# Patient Record
Sex: MALE | Race: WHITE | HISPANIC OR LATINO | Employment: FULL TIME | URBAN - NONMETROPOLITAN AREA
[De-identification: names, ages, dates, MRNs, and addresses within clinical notes are randomized per-mention and may not be internally consistent; named-entity substitution may affect disease eponyms.]

---

## 2023-03-09 DIAGNOSIS — R97.20 ELEVATED PSA: ICD-10-CM

## 2023-03-09 RX ORDER — TAMSULOSIN HYDROCHLORIDE 0.4 MG/1
1 CAPSULE ORAL DAILY
COMMUNITY
Start: 2023-02-22 | End: 2023-03-09 | Stop reason: SDUPTHER

## 2023-03-10 RX ORDER — TAMSULOSIN HYDROCHLORIDE 0.4 MG/1
0.4 CAPSULE ORAL DAILY
Qty: 90 CAPSULE | Refills: 3 | Status: SHIPPED | OUTPATIENT
Start: 2023-03-10 | End: 2023-03-20

## 2023-03-18 DIAGNOSIS — R97.20 ELEVATED PSA: ICD-10-CM

## 2023-03-20 RX ORDER — TAMSULOSIN HYDROCHLORIDE 0.4 MG/1
CAPSULE ORAL
Qty: 30 CAPSULE | Refills: 11 | Status: SHIPPED | OUTPATIENT
Start: 2023-03-20 | End: 2024-05-14

## 2023-03-28 PROBLEM — R30.0 DYSURIA: Status: ACTIVE | Noted: 2023-03-28

## 2023-03-28 PROBLEM — M19.90 ARTHRITIS: Status: ACTIVE | Noted: 2023-03-28

## 2023-03-28 PROBLEM — R73.02 IGT (IMPAIRED GLUCOSE TOLERANCE): Status: ACTIVE | Noted: 2023-03-28

## 2023-03-28 PROBLEM — R97.20 ELEVATED PSA: Status: ACTIVE | Noted: 2023-03-28

## 2023-03-28 PROBLEM — R35.1 NOCTURIA: Status: ACTIVE | Noted: 2023-03-28

## 2023-03-28 PROBLEM — C18.9 MALIGNANT NEOPLASM OF COLON (MULTI): Status: ACTIVE | Noted: 2023-03-28

## 2023-03-28 PROBLEM — R21 RASH: Status: ACTIVE | Noted: 2023-03-28

## 2023-03-28 RX ORDER — CHLORTHALIDONE 25 MG/1
1 TABLET ORAL DAILY
COMMUNITY
Start: 2023-02-01 | End: 2023-05-18 | Stop reason: SDUPTHER

## 2023-03-28 RX ORDER — PANTOPRAZOLE SODIUM 40 MG/1
1 TABLET, DELAYED RELEASE ORAL DAILY
COMMUNITY
Start: 2023-02-01 | End: 2023-05-18 | Stop reason: SDUPTHER

## 2023-03-28 RX ORDER — ACETAMINOPHEN 500 MG
TABLET ORAL
COMMUNITY
Start: 2023-02-01

## 2023-03-28 RX ORDER — AMLODIPINE BESYLATE 5 MG/1
1 TABLET ORAL DAILY
COMMUNITY
Start: 2016-05-24 | End: 2024-02-19 | Stop reason: SDUPTHER

## 2023-03-28 RX ORDER — QUINAPRIL 40 MG/1
1 TABLET ORAL DAILY
COMMUNITY
Start: 2016-05-07 | End: 2023-05-08 | Stop reason: ALTCHOICE

## 2023-03-28 RX ORDER — CLOTRIMAZOLE AND BETAMETHASONE DIPROPIONATE 10; .64 MG/G; MG/G
1 CREAM TOPICAL 2 TIMES DAILY
COMMUNITY
Start: 2023-02-27 | End: 2023-05-08 | Stop reason: ALTCHOICE

## 2023-03-28 RX ORDER — POTASSIUM CHLORIDE 1500 MG/1
1 TABLET, EXTENDED RELEASE ORAL 2 TIMES DAILY
COMMUNITY
Start: 2023-02-01 | End: 2023-05-18 | Stop reason: SDUPTHER

## 2023-03-29 ENCOUNTER — OFFICE VISIT (OUTPATIENT)
Dept: PRIMARY CARE | Facility: CLINIC | Age: 68
End: 2023-03-29
Payer: COMMERCIAL

## 2023-03-29 ENCOUNTER — TELEPHONE (OUTPATIENT)
Dept: PRIMARY CARE | Facility: CLINIC | Age: 68
End: 2023-03-29

## 2023-03-29 ENCOUNTER — LAB (OUTPATIENT)
Dept: LAB | Facility: LAB | Age: 68
End: 2023-03-29
Payer: COMMERCIAL

## 2023-03-29 VITALS
BODY MASS INDEX: 48.03 KG/M2 | SYSTOLIC BLOOD PRESSURE: 144 MMHG | WEIGHT: 306 LBS | DIASTOLIC BLOOD PRESSURE: 94 MMHG | HEART RATE: 68 BPM | HEIGHT: 67 IN | OXYGEN SATURATION: 98 %

## 2023-03-29 DIAGNOSIS — M19.90 INFLAMMATORY ARTHROPATHY: Primary | ICD-10-CM

## 2023-03-29 DIAGNOSIS — M19.90 INFLAMMATORY ARTHROPATHY: ICD-10-CM

## 2023-03-29 DIAGNOSIS — K21.00 GASTROESOPHAGEAL REFLUX DISEASE WITH ESOPHAGITIS WITHOUT HEMORRHAGE: ICD-10-CM

## 2023-03-29 DIAGNOSIS — R07.89 ATYPICAL CHEST PAIN: ICD-10-CM

## 2023-03-29 LAB
C REACTIVE PROTEIN (MG/L) IN SER/PLAS: 1.67 MG/DL
SEDIMENTATION RATE, ERYTHROCYTE: 75 MM/H (ref 0–20)
URATE (MG/DL) IN SER/PLAS: 6.8 MG/DL (ref 4–7.5)

## 2023-03-29 PROCEDURE — 3077F SYST BP >= 140 MM HG: CPT | Performed by: FAMILY MEDICINE

## 2023-03-29 PROCEDURE — 86140 C-REACTIVE PROTEIN: CPT

## 2023-03-29 PROCEDURE — 84550 ASSAY OF BLOOD/URIC ACID: CPT

## 2023-03-29 PROCEDURE — 85652 RBC SED RATE AUTOMATED: CPT

## 2023-03-29 PROCEDURE — 1036F TOBACCO NON-USER: CPT | Performed by: FAMILY MEDICINE

## 2023-03-29 PROCEDURE — 1159F MED LIST DOCD IN RCRD: CPT | Performed by: FAMILY MEDICINE

## 2023-03-29 PROCEDURE — 86200 CCP ANTIBODY: CPT

## 2023-03-29 PROCEDURE — 86039 ANTINUCLEAR ANTIBODIES (ANA): CPT

## 2023-03-29 PROCEDURE — 99214 OFFICE O/P EST MOD 30 MIN: CPT | Performed by: FAMILY MEDICINE

## 2023-03-29 PROCEDURE — 86038 ANTINUCLEAR ANTIBODIES: CPT

## 2023-03-29 PROCEDURE — 36415 COLL VENOUS BLD VENIPUNCTURE: CPT

## 2023-03-29 PROCEDURE — 86225 DNA ANTIBODY NATIVE: CPT

## 2023-03-29 PROCEDURE — 86235 NUCLEAR ANTIGEN ANTIBODY: CPT

## 2023-03-29 PROCEDURE — 86431 RHEUMATOID FACTOR QUANT: CPT

## 2023-03-29 PROCEDURE — 3080F DIAST BP >= 90 MM HG: CPT | Performed by: FAMILY MEDICINE

## 2023-03-29 RX ORDER — PREDNISONE 10 MG/1
60 TABLET ORAL DAILY
Qty: 30 TABLET | Refills: 3 | Status: SHIPPED | OUTPATIENT
Start: 2023-03-29 | End: 2024-06-03

## 2023-03-29 RX ORDER — RAMIPRIL 10 MG/1
CAPSULE ORAL
COMMUNITY
End: 2023-05-18 | Stop reason: SDUPTHER

## 2023-03-29 RX ORDER — TRAMADOL HYDROCHLORIDE 50 MG/1
50 TABLET ORAL EVERY 6 HOURS PRN
Qty: 27 TABLET | Refills: 0 | Status: SHIPPED | OUTPATIENT
Start: 2023-03-29 | End: 2023-04-03

## 2023-03-29 NOTE — PROGRESS NOTES
"Subjective   Patient ID: Joseph Isaac is a 68 y.o. male who presents for ER Follow-up (Patient was admitted for severe heart burn. Had a CT scan done of the chest/ abdomen/ blood work. ) and Hand Pain (X 1 week. Has swelling and persistent throbbing/dull aching pain. Has been prescribed prednisone and is on his 4th day. States this has helped but has not had much relief. ).    HPI reviewed data from [facility], evaluated for chest pain.  Including CTA.  He has follow-up appointment scheduled with Dr. Anthony for probable EGD.  He notes when he goes to the gym and lifts weights there is no problems but if he is active after a meal he has some substernal epigastric discomfort.  6 days ago began with pain in the right wrist, he had driven in [place] had been doing some lifting helping father-in-law's transition to nursing care.  He has been on Pred 40 a day for 4 days and this pain and swelling is only beginning to slightly improve.  He has been wearing immobilizer.  No previous episode on right wrist but of couple years ago had a similar episode on left wrist.    Review of Systems as per HPI    Objective   BP (!) 144/94 (BP Location: Left arm, Patient Position: Sitting)   Pulse 68   Ht 1.702 m (5' 7\")   Wt (!) 139 kg (306 lb)   SpO2 98%   BMI 47.93 kg/m²     Physical Exam  Right wrist extends 15 flexes 15 diffuse fusiform swelling over the right wrist area in the hand.  Palpation shows no tenderness except in the ulnar styloid and the radiocarpal CMC joint region.  No overlying erythema.  Assessment/Plan   Problem List Items Addressed This Visit          Digestive    Gastroesophageal reflux disease       Musculoskeletal    Inflammatory arthropathy - Primary    Relevant Medications    predniSONE (Deltasone) 10 mg tablet    traMADol (Ultram) 50 mg tablet    Other Relevant Orders    GEOVANY with Reflex to IRIS    Citrulline Antibody, IgG    C-Reactive Protein    Rheumatoid Factor    Sedimentation Rate    Uric " Acid       Other    Atypical chest pain    Relevant Orders    Nuclear Stress Test     CT showed significant coronary calcifications and with his epigastric/substernal discomfort we will do a nuclear stress test, chemical as orthopedically limited with knee surgery, to see if any of these calcifications are hemodynamically significant.  Proceed with GI eval by Dr. Anthony upcoming would expect EGD  CAT scan showed no structural abnormalities  We will increase prednisone to 60 mg a day perform x-ray, and do inflammatory arthropathy laboratory.  Follow-up per telephone call as information becomes available

## 2023-03-29 NOTE — PROGRESS NOTES
"Subjective   Patient ID: Joseph Isaac is a 68 y.o. male who presents for ER Follow-up (Patient was admitted for severe heart burn. Had a CT scan done of the chest/ abdomen/ blood work. ) and Hand Pain (X 1 week. Has swelling and persistent throbbing/dull aching pain. Has been prescribed prednisone and is on his 4th day. States this has helped but has not had much relief. ).    HPI     Review of Systems    Objective   BP (!) 144/94 (BP Location: Left arm, Patient Position: Sitting)   Pulse 68   Ht 1.702 m (5' 7\")   Wt (!) 139 kg (306 lb)   SpO2 98%   BMI 47.93 kg/m²     Physical Exam    Assessment/Plan          "

## 2023-03-30 LAB
ANA PATTERN: ABNORMAL
ANA TITER: ABNORMAL
ANTI-CENTROMERE: <0.2 AI
ANTI-CHROMATIN: <0.2 AI
ANTI-DNA (DS): <1 IU/ML
ANTI-JO-1 IGG: <0.2 AI
ANTI-NUCLEAR ANTIBODY (ANA): POSITIVE
ANTI-RIBOSOMAL P: <0.2 AI
ANTI-RNP: 0.3 AI
ANTI-SCL-70: <0.2 AI
ANTI-SM/RNP: <0.2 AI
ANTI-SM: <0.2 AI
ANTI-SSA: <0.2 AI
ANTI-SSB: <0.2 AI
RHEUMATOID FACTOR (IU/ML) IN SERUM OR PLASMA: <10 IU/ML (ref 0–15)

## 2023-04-01 LAB — CITRULLINE ANTIBODY, IGG: 4 UNITS (ref 0–19)

## 2023-04-20 DIAGNOSIS — I25.110 CORONARY ARTERY DISEASE INVOLVING NATIVE HEART WITH UNSTABLE ANGINA PECTORIS, UNSPECIFIED VESSEL OR LESION TYPE (MULTI): Primary | ICD-10-CM

## 2023-05-03 ENCOUNTER — PATIENT OUTREACH (OUTPATIENT)
Dept: CARE COORDINATION | Facility: CLINIC | Age: 68
End: 2023-05-03
Payer: COMMERCIAL

## 2023-05-03 DIAGNOSIS — I25.10 ATHEROSCLEROSIS OF CORONARY ARTERY OF NATIVE HEART WITHOUT ANGINA PECTORIS, UNSPECIFIED VESSEL OR LESION TYPE: ICD-10-CM

## 2023-05-03 NOTE — PROGRESS NOTES
Discharge Facility: [facility]  Discharge Diagnosis:ASHD  Admission Date:4.28.23  Discharge Date: 5.2.23    PCP Appointment Date:not scheduled  Specialist Appointment Date: info culd not be found  Hospital Encounter and Summary: Linked   See discharge assessment below for further details     Engagement  Call Start Time: 1206 (5/3/2023 12:06 PM)    Medications  Medications reviewed with patient/caregiver?: No (dc med list from [place] not available) (5/3/2023 12:06 PM)  Is the patient having any side effects they believe may be caused by any medication additions or changes?: No (5/3/2023 12:06 PM)  Does the patient have all medications ordered at discharge?: Yes (5/3/2023 12:06 PM)  Is the patient taking all medications as directed (includes completed medication regime)?: -- (not taking pain med) (5/3/2023 12:06 PM)  Care Management Interventions: Provided patient education (5/3/2023 12:06 PM)    Appointments  Does the patient have a primary care provider?: Yes (5/3/2023 12:06 PM)  Care Management Interventions: -- (patient to call for appt) (5/3/2023 12:06 PM)  Has the patient kept scheduled appointments due by today?: Yes (5/3/2023 12:06 PM)    Self Management  What is the home health agency?: yes-ordered through [facility] (5/3/2023 12:06 PM)  Has home health visited the patient within 72 hours of discharge?: Not applicable (5/3/2023 12:06 PM)    Patient Teaching  Does the patient have access to their discharge instructions?: Yes (5/3/2023 12:06 PM)  Care Management Interventions: Reviewed instructions with patient (5/3/2023 12:06 PM)  What is the patient's perception of their health status since discharge?: Improving (5/3/2023 12:06 PM)

## 2023-05-04 ENCOUNTER — TELEPHONE (OUTPATIENT)
Dept: PRIMARY CARE | Facility: CLINIC | Age: 68
End: 2023-05-04
Payer: COMMERCIAL

## 2023-05-04 NOTE — TELEPHONE ENCOUNTER
CALLED TO REPORT WRIST PAIN AND DECREASED  X 2 DAYS. PREVIOUSLY GIVEN PREDNISONE, WHICH HE STILL HAS. SINCE HE HAD CABG X 4 4/28/23 HE DOES NOT WANT TO TAKE ANYTHING WITHOUT CHECKING WITH YOU FIRST. PLEASE ADVISE

## 2023-05-05 ENCOUNTER — TELEPHONE (OUTPATIENT)
Dept: PRIMARY CARE | Facility: CLINIC | Age: 68
End: 2023-05-05
Payer: COMMERCIAL

## 2023-05-08 ENCOUNTER — OFFICE VISIT (OUTPATIENT)
Dept: PRIMARY CARE | Facility: CLINIC | Age: 68
End: 2023-05-08
Payer: COMMERCIAL

## 2023-05-08 VITALS
HEART RATE: 71 BPM | OXYGEN SATURATION: 96 % | SYSTOLIC BLOOD PRESSURE: 140 MMHG | HEIGHT: 67 IN | BODY MASS INDEX: 47.89 KG/M2 | WEIGHT: 305.1 LBS | DIASTOLIC BLOOD PRESSURE: 90 MMHG

## 2023-05-08 DIAGNOSIS — R60.9 EDEMA, UNSPECIFIED TYPE: ICD-10-CM

## 2023-05-08 DIAGNOSIS — L03.116 CELLULITIS OF LEFT LOWER EXTREMITY: Primary | ICD-10-CM

## 2023-05-08 DIAGNOSIS — E66.01 MORBID OBESITY WITH BMI OF 45.0-49.9, ADULT (MULTI): ICD-10-CM

## 2023-05-08 DIAGNOSIS — I25.810 CORONARY ARTERY DISEASE INVOLVING AUTOLOGOUS ARTERY CORONARY BYPASS GRAFT WITHOUT ANGINA PECTORIS: ICD-10-CM

## 2023-05-08 DIAGNOSIS — M19.90 INFLAMMATORY ARTHROPATHY: ICD-10-CM

## 2023-05-08 PROCEDURE — 20605 DRAIN/INJ JOINT/BURSA W/O US: CPT | Performed by: FAMILY MEDICINE

## 2023-05-08 PROCEDURE — 3080F DIAST BP >= 90 MM HG: CPT | Performed by: FAMILY MEDICINE

## 2023-05-08 PROCEDURE — 3008F BODY MASS INDEX DOCD: CPT | Performed by: FAMILY MEDICINE

## 2023-05-08 PROCEDURE — 1160F RVW MEDS BY RX/DR IN RCRD: CPT | Performed by: FAMILY MEDICINE

## 2023-05-08 PROCEDURE — 1036F TOBACCO NON-USER: CPT | Performed by: FAMILY MEDICINE

## 2023-05-08 PROCEDURE — 99214 OFFICE O/P EST MOD 30 MIN: CPT | Performed by: FAMILY MEDICINE

## 2023-05-08 PROCEDURE — 3077F SYST BP >= 140 MM HG: CPT | Performed by: FAMILY MEDICINE

## 2023-05-08 PROCEDURE — 1159F MED LIST DOCD IN RCRD: CPT | Performed by: FAMILY MEDICINE

## 2023-05-08 RX ORDER — CEPHALEXIN 500 MG/1
500 CAPSULE ORAL 4 TIMES DAILY
Qty: 28 CAPSULE | Refills: 1 | Status: SHIPPED | OUTPATIENT
Start: 2023-05-08 | End: 2023-05-18 | Stop reason: ALTCHOICE

## 2023-05-08 RX ORDER — NITROGLYCERIN 0.4 MG/1
0.4 TABLET SUBLINGUAL EVERY 5 MIN PRN
COMMUNITY
Start: 2023-04-11

## 2023-05-08 RX ORDER — ASPIRIN 81 MG/1
162 TABLET ORAL
Qty: 60 TABLET | Refills: 0 | COMMUNITY
Start: 2023-05-03 | End: 2023-06-02

## 2023-05-08 RX ORDER — ATORVASTATIN CALCIUM 40 MG/1
40 TABLET, FILM COATED ORAL
Qty: 30 TABLET | Refills: 11 | COMMUNITY
Start: 2023-04-11 | End: 2024-04-10

## 2023-05-08 RX ORDER — METOPROLOL TARTRATE 25 MG/1
25 TABLET, FILM COATED ORAL 2 TIMES DAILY
Qty: 60 TABLET | Refills: 11 | COMMUNITY
Start: 2023-04-18 | End: 2023-05-18 | Stop reason: SDUPTHER

## 2023-05-08 NOTE — PROGRESS NOTES
"Subjective   Patient ID: Joseph Isaac is a 68 y.o. male who presents for s/p  TKR CABG.    HPI patient is approximately 10 days post CABG.  Leg wounds show more than the usual amount of erythema.  He was given Lasix for 7 days and potassium for 7 days post bypass.  There is edema in both ankles and the left is a little more swollen than the right.  He also has a large hematoma in the right thigh posteriorly.  No problems with chest wounds and drains.    During the hospitalization they declared he was diabetic with an A1c of 7.0 he had instruction was on 4 times daily fingersticks with insulin sliding scale.  He was prescribed Lantus 20 units at discharge but no prescription was provided.  He has been on sliding scale but has not had any coverage.  He brings in a list of all of his blood sugars his a.m.'s are averaging under 130 and presupper's are averaging under 140 he is on metformin 500 mg1/day with some GI side effects reviewed guidelines to show lifestyle and metformin will be the initial thrust and as he returns to his regular sense of wellbeing we will see if lifestyle changes can result in him becoming euglycemic.  We can reduce Accu-Cheks to twice daily and report them in a week  Cellulitis around the incisions on the left calf will be treated with doxycycline if tolerated  The asymmetry of swelling could be due to surgery or hematoma we have to consider a DVT so a Doppler is ordered  The inflammatory arthropathy versus degenerative in right wrist has again flared over the CMC joint.  Both I and the cardiovascular thoracic surgeon was reluctant to give systemic steroids at this time so I injected 10 mg of Kenalog and 0.25 mL of 0.5% bupivacaine under sterile technique to the right CMC joint  With BMI of 48 we will hope that diet to control diabetes will result in weight loss  Review of Systems  As per HPI  Objective   /90   Pulse 71   Ht 1.702 m (5' 7\")   Wt 138 kg (305 lb 1.6 oz)   SpO2 " 96%   BMI 47.79 kg/m²     Physical Exam  No bruit thyroid nontender heart regular no murmur lungs clear.  Midline sternotomy scar is minimal erythema at the edges about 3 mm it is slightly tender throughout but there is no fluctuance or fluid collection.  The drain wounds have a suture in place and have mild erythema of edge about 4 mm wounds in the leg have greater than 5 mm surrounding erythema some very mild distal erythema in the left calf is 2+ edema is tender positive Homans  Assessment/Plan   Problem List Items Addressed This Visit          Musculoskeletal    Inflammatory arthropathy       Endocrine/Metabolic    Morbid obesity with BMI of 45.0-49.9, adult (CMS/[facility])     Other Visit Diagnoses       Cellulitis of left lower extremity    -  Primary    Relevant Medications    cephalexin (Keflex) 500 mg capsule    Other Relevant Orders    Follow Up In Primary Care    Edema, unspecified type        Relevant Orders    Vascular US lower extremity venous duplex bilateral    Follow Up In Primary Care    Coronary artery disease involving autologous artery coronary bypass graft without angina pectoris        Relevant Medications    metoprolol tartrate (Lopressor) 25 mg tablet    nitroglycerin (Nitrostat) 0.4 mg SL tablet          Await Doppler results before considering anticoagulation.  Monitor for improvement in wrist postinjection.  After he recovers from surgery he will need rheumatology referral.   Report sugars in a couple weeks appointment follow-up in a couple weeks Doppler ASAP and Keflex for cellulitis

## 2023-05-11 ENCOUNTER — TELEPHONE (OUTPATIENT)
Dept: PRIMARY CARE | Facility: CLINIC | Age: 68
End: 2023-05-11
Payer: COMMERCIAL

## 2023-05-11 DIAGNOSIS — M19.90 INFLAMMATORY ARTHROPATHY: Primary | ICD-10-CM

## 2023-05-11 RX ORDER — PREDNISONE 10 MG/1
40 TABLET ORAL DAILY
Qty: 20 TABLET | Refills: 3 | Status: SHIPPED | OUTPATIENT
Start: 2023-05-11 | End: 2023-05-18 | Stop reason: ALTCHOICE

## 2023-05-11 NOTE — TELEPHONE ENCOUNTER
"PATIENT CALLED TO REPORT HE NOW HAS L WRIST PAIN AND EDEMA. PER CARDIOLOGIST, SHE FELT LOW DOSE PREDNISONE WOULD BE ACCEPTABLE IF NOTHING ELSE IS WORKING. USING OXYCODONE PRESCRIBED FOR INCISIONAL PAIN WITH ONLY A FEW HOURS OF RELIEF. SWELLING IS BOTHERSOME.    PATIENT CALLED BACK TO PROVIDE ADDITIONAL INFORMATION. CORTISONE INJECTION RECEIVED IN RIGHT WRIST DID VERY LITTLE TO HELP WITH PAIN. HE STATES HIS LEFT HAND IS \"HUGE\". PLEASE ADVISE.  "

## 2023-05-18 ENCOUNTER — TELEPHONE (OUTPATIENT)
Dept: PRIMARY CARE | Facility: CLINIC | Age: 68
End: 2023-05-18

## 2023-05-18 ENCOUNTER — OFFICE VISIT (OUTPATIENT)
Dept: PRIMARY CARE | Facility: CLINIC | Age: 68
End: 2023-05-18
Payer: COMMERCIAL

## 2023-05-18 VITALS
WEIGHT: 295.7 LBS | DIASTOLIC BLOOD PRESSURE: 90 MMHG | HEIGHT: 67 IN | HEART RATE: 55 BPM | OXYGEN SATURATION: 98 % | BODY MASS INDEX: 46.41 KG/M2 | SYSTOLIC BLOOD PRESSURE: 150 MMHG

## 2023-05-18 DIAGNOSIS — R60.9 EDEMA, UNSPECIFIED TYPE: ICD-10-CM

## 2023-05-18 DIAGNOSIS — K21.00 GASTROESOPHAGEAL REFLUX DISEASE WITH ESOPHAGITIS WITHOUT HEMORRHAGE: ICD-10-CM

## 2023-05-18 DIAGNOSIS — I10 ESSENTIAL HYPERTENSION: ICD-10-CM

## 2023-05-18 DIAGNOSIS — L03.116 CELLULITIS OF LEFT LOWER EXTREMITY: ICD-10-CM

## 2023-05-18 DIAGNOSIS — E66.01 SEVERE OBESITY (BMI >= 40) (MULTI): ICD-10-CM

## 2023-05-18 DIAGNOSIS — M19.90 INFLAMMATORY ARTHROPATHY: Primary | ICD-10-CM

## 2023-05-18 DIAGNOSIS — R73.02 IGT (IMPAIRED GLUCOSE TOLERANCE): ICD-10-CM

## 2023-05-18 PROCEDURE — 99214 OFFICE O/P EST MOD 30 MIN: CPT | Performed by: FAMILY MEDICINE

## 2023-05-18 PROCEDURE — 1036F TOBACCO NON-USER: CPT | Performed by: FAMILY MEDICINE

## 2023-05-18 PROCEDURE — 3080F DIAST BP >= 90 MM HG: CPT | Performed by: FAMILY MEDICINE

## 2023-05-18 PROCEDURE — 1160F RVW MEDS BY RX/DR IN RCRD: CPT | Performed by: FAMILY MEDICINE

## 2023-05-18 PROCEDURE — 3008F BODY MASS INDEX DOCD: CPT | Performed by: FAMILY MEDICINE

## 2023-05-18 PROCEDURE — 1159F MED LIST DOCD IN RCRD: CPT | Performed by: FAMILY MEDICINE

## 2023-05-18 PROCEDURE — 3077F SYST BP >= 140 MM HG: CPT | Performed by: FAMILY MEDICINE

## 2023-05-18 RX ORDER — METFORMIN HYDROCHLORIDE 500 MG/1
500 TABLET ORAL DAILY
COMMUNITY
End: 2023-05-18 | Stop reason: SDUPTHER

## 2023-05-18 RX ORDER — METFORMIN HYDROCHLORIDE 500 MG/1
500 TABLET ORAL DAILY
Qty: 90 TABLET | Refills: 3 | Status: SHIPPED | OUTPATIENT
Start: 2023-05-18 | End: 2024-05-07

## 2023-05-18 RX ORDER — CHLORTHALIDONE 25 MG/1
25 TABLET ORAL DAILY
Qty: 90 TABLET | Refills: 3 | Status: SHIPPED | OUTPATIENT
Start: 2023-05-18 | End: 2024-05-17

## 2023-05-18 RX ORDER — POTASSIUM CHLORIDE 1500 MG/1
20 TABLET, EXTENDED RELEASE ORAL 2 TIMES DAILY
Qty: 180 TABLET | Refills: 3 | Status: SHIPPED | OUTPATIENT
Start: 2023-05-18 | End: 2024-05-17

## 2023-05-18 RX ORDER — RAMIPRIL 10 MG/1
10 CAPSULE ORAL
Qty: 90 CAPSULE | Refills: 3 | Status: SHIPPED | OUTPATIENT
Start: 2023-05-18 | End: 2024-05-17

## 2023-05-18 RX ORDER — PANTOPRAZOLE SODIUM 40 MG/1
40 TABLET, DELAYED RELEASE ORAL DAILY
Qty: 90 TABLET | Refills: 3 | Status: SHIPPED | OUTPATIENT
Start: 2023-05-18 | End: 2024-05-17

## 2023-05-18 RX ORDER — METOPROLOL TARTRATE 25 MG/1
25 TABLET, FILM COATED ORAL 2 TIMES DAILY
Qty: 180 TABLET | Refills: 3 | Status: SHIPPED | OUTPATIENT
Start: 2023-05-18 | End: 2023-08-21 | Stop reason: SDUPTHER

## 2023-05-18 NOTE — TELEPHONE ENCOUNTER
Griselda from [facility] called to inform us that pt is declining any further home health visit for PT or nurse because pt states he is doing well. If you have any questions for Griselda her number is [phone].

## 2023-05-18 NOTE — TELEPHONE ENCOUNTER
Griselda from [facility] called to let us know Pt is declining all further home health visits for PT and nurses visits because he states he is doing well. If any questions Griselda's number is [phone].

## 2023-05-18 NOTE — PROGRESS NOTES
"Subjective   Patient ID: Joseph Isaac is a 68 y.o. male who presents for Follow-up (2 wk).    HPI looks remarkably better.  2 x 9'walks a day, shooting for 20  Replace [id number] as presented blood pressures are averaging above 140 above 85.  He was previously on and was stopped in the perioperative..  I feel he is reapproved indication.  We will monitor to see if edema further resolves with resumption of ACE inhibitor  Brings a list of Bs checks.  His fastings are under 130 and his presupper's are under 140, tries to limit to 60g carb and smaller portion.  Some of the weight loss is fluid reduction but he is really been good in his diet so he is in fact losing mass.  Maintained on metformin 501 a day   cellulitis on leg is resolved  Right wrist was injected previously and is resolved left wrist flared and he did have a taper of prednisone.  He has refills if flares.  Dr. Reyes is on leave until August we will refer to Dr. Couch  Edema is l greatly reduced   Review of Systems  Currently no fever chills no chest pains palpitations no cough shortness of breath.  He is increasing his exercise tolerance and capacity.  Objective   /90   Pulse 55   Ht 1.702 m (5' 7\")   Wt 134 kg (295 lb 11.2 oz)   SpO2 98%   BMI 46.31 kg/m²     Physical Exam  Wounds on chest drains and leg are clean and dry still small amount of scabbing.  Chest is regular without murmur lungs are clear to auscultation.  1+ edema in ankles.  Assessment/Plan   Problem List Items Addressed This Visit          Circulatory    Essential hypertension    Relevant Medications    chlorthalidone (Hygroton) 25 mg tablet    potassium chloride CR (K-Tab) 20 mEq ER tablet    ramipril (Altace) 10 mg capsule    metoprolol tartrate (Lopressor) 25 mg tablet       Digestive    Gastroesophageal reflux disease    Relevant Medications    pantoprazole (ProtoNix) 40 mg EC tablet       Musculoskeletal    Inflammatory arthropathy - Primary    Relevant Orders "    Referral to Rheumatology    Disability Placard       Endocrine/Metabolic    IGT (impaired glucose tolerance)    Relevant Medications    metFORMIN (Glucophage) 500 mg tablet     Other Visit Diagnoses       Cellulitis of left lower extremity        Edema, unspecified type

## 2023-05-18 NOTE — TELEPHONE ENCOUNTER
REFERRAL TO DR. PORTIA SIMON/RHEUMATOLOGY WITH INSURANCE, DEMOGRAPHICS, AND RECORDS FAXED WITH REQUEST TO SCHEDULE PATIENT AT FAX: [phone] AND PH: [phone].

## 2023-05-22 DIAGNOSIS — M19.90 INFLAMMATORY ARTHROPATHY: ICD-10-CM

## 2023-05-22 PROBLEM — E66.01 SEVERE OBESITY (BMI >= 40) (MULTI): Status: ACTIVE | Noted: 2023-05-22

## 2023-05-22 RX ORDER — PREDNISONE 10 MG/1
40 TABLET ORAL DAILY
Qty: 20 TABLET | Refills: 3 | Status: SHIPPED | OUTPATIENT
Start: 2023-05-22 | End: 2023-05-27

## 2023-05-22 NOTE — TELEPHONE ENCOUNTER
Pt requested refill on prednisone. Rx was discontinued in chart so old refills are not valid at pharmacy.

## 2023-06-02 ENCOUNTER — PATIENT OUTREACH (OUTPATIENT)
Dept: CARE COORDINATION | Facility: CLINIC | Age: 68
End: 2023-06-02
Payer: COMMERCIAL

## 2023-06-02 NOTE — PROGRESS NOTES
Unable to reach patient for one month post discharge follow up call.               M with call back number for patient to call if needed    If no voicemail available call attempts x 2 were made to contact the patient to assist                         any questions or concerns patient may have.

## 2023-08-21 ENCOUNTER — OFFICE VISIT (OUTPATIENT)
Dept: PRIMARY CARE | Facility: CLINIC | Age: 68
End: 2023-08-21
Payer: COMMERCIAL

## 2023-08-21 VITALS
OXYGEN SATURATION: 99 % | SYSTOLIC BLOOD PRESSURE: 120 MMHG | DIASTOLIC BLOOD PRESSURE: 82 MMHG | HEIGHT: 67 IN | HEART RATE: 60 BPM | BODY MASS INDEX: 46.93 KG/M2 | WEIGHT: 299 LBS

## 2023-08-21 DIAGNOSIS — E66.01 SEVERE OBESITY (BMI >= 40) (MULTI): ICD-10-CM

## 2023-08-21 DIAGNOSIS — R73.02 IGT (IMPAIRED GLUCOSE TOLERANCE): Primary | ICD-10-CM

## 2023-08-21 DIAGNOSIS — I25.810 CORONARY ARTERY DISEASE INVOLVING AUTOLOGOUS ARTERY CORONARY BYPASS GRAFT WITHOUT ANGINA PECTORIS: ICD-10-CM

## 2023-08-21 DIAGNOSIS — M19.90 INFLAMMATORY ARTHROPATHY: ICD-10-CM

## 2023-08-21 DIAGNOSIS — I10 ESSENTIAL HYPERTENSION: ICD-10-CM

## 2023-08-21 DIAGNOSIS — K21.00 GASTROESOPHAGEAL REFLUX DISEASE WITH ESOPHAGITIS WITHOUT HEMORRHAGE: ICD-10-CM

## 2023-08-21 PROCEDURE — 99214 OFFICE O/P EST MOD 30 MIN: CPT | Performed by: FAMILY MEDICINE

## 2023-08-21 PROCEDURE — 1036F TOBACCO NON-USER: CPT | Performed by: FAMILY MEDICINE

## 2023-08-21 PROCEDURE — 3079F DIAST BP 80-89 MM HG: CPT | Performed by: FAMILY MEDICINE

## 2023-08-21 PROCEDURE — 3074F SYST BP LT 130 MM HG: CPT | Performed by: FAMILY MEDICINE

## 2023-08-21 PROCEDURE — 1160F RVW MEDS BY RX/DR IN RCRD: CPT | Performed by: FAMILY MEDICINE

## 2023-08-21 PROCEDURE — 3008F BODY MASS INDEX DOCD: CPT | Performed by: FAMILY MEDICINE

## 2023-08-21 PROCEDURE — 1159F MED LIST DOCD IN RCRD: CPT | Performed by: FAMILY MEDICINE

## 2023-08-21 RX ORDER — METOPROLOL TARTRATE 25 MG/1
25 TABLET, FILM COATED ORAL NIGHTLY
Qty: 90 TABLET | Refills: 3 | Status: SHIPPED | OUTPATIENT
Start: 2023-08-21 | End: 2024-08-20

## 2023-08-21 NOTE — PROGRESS NOTES
"Subjective   Patient ID: Joseph Isaac is a 68 y.o. male who presents for Follow-up (6 mo).    HPI in April of this year he had a CABG  Has completed 24 of 36 card rehab.    Occ vikas   mid morning- cardio awarre, reduce meto prolol to dailoy and ordered sleep study  To see igo for wrist arthritis  Weight up 4# inlast 3 mo  Review of Systems  General-resolution of fatigue, weight to within 10 pounds  ENT no problems with vision swallowing  Cardiac no chest pains palpitations change in exercise tolerance or capacity  Pulmonary no cough shortness of breath  GI no heartburn or abdominal pain  Musculoskeletal no joint pains  Objective   /82   Pulse 60   Ht 1.702 m (5' 7\")   Wt 136 kg (299 lb)   SpO2 99%   BMI 46.83 kg/m²     Physical Exam  General:  Alert, No acute distress. Appears stated age  Eye:  Pupils are equal, round and reactive to light, Extraocular movements are intact, Normal conjunctiva.    Neck:  Supple, Non-tender, No carotid bruit, No jugular venous distention, No lymphadenopathy, No thyromegaly.    Respiratory:  Lungs are clear to auscultation, Respirations are non-labored, Breath sounds are equal.    Cardiovascular:  Normal rate, Regular rhythm, No murmur.    Gastrointestinal:  Soft, Non-tender, No organomegaly. No solid or pulsatile mass  Integumentary:  Warm, Dry. No concerning lesions on exposed areas  Neurologic:  Alert, Oriented.  Gross and fine motor intact, CN 2-12 intact  Psychiatric:  Cooperative, Appropriate mood & affect.  Assessment/Plan   Problem List Items Addressed This Visit       Gastroesophageal reflux disease    Relevant Orders    Follow Up In Primary Care    Essential hypertension    Relevant Medications    metoprolol tartrate (Lopressor) 25 mg tablet    Other Relevant Orders    Follow Up In Primary Care    IGT (impaired glucose tolerance) - Primary    Relevant Orders    Comprehensive Metabolic Panel    CBC    Albumin , Urine Random    Hemoglobin A1C    Follow Up In " Primary Care    Inflammatory arthropathy    Severe obesity (BMI >= 40) (CMS/[facility])    Relevant Orders    Follow Up In Primary Care    Coronary artery disease involving autologous artery coronary bypass graft without angina pectoris    Relevant Medications    metoprolol tartrate (Lopressor) 25 mg tablet    Other Relevant Orders    CBC    Lipid Panel    Follow Up In Primary Care

## 2023-09-06 NOTE — TELEPHONE ENCOUNTER
FORWARDED REQUEST TO [facility] TO SCHEDULE PATIENT FOR US LOWER EXTREMITY VENOUS DUPLEX FOR TOMORROW AT 1:00.  PATIENT'S WIFE GIVEN PREPROCEDURE INSTRUCTIONS.    severe

## 2023-09-21 DIAGNOSIS — M19.90 INFLAMMATORY ARTHROPATHY: Primary | ICD-10-CM

## 2023-09-21 RX ORDER — PREDNISONE 10 MG/1
40 TABLET ORAL DAILY
Qty: 20 TABLET | Refills: 1 | Status: SHIPPED | OUTPATIENT
Start: 2023-09-21 | End: 2023-09-26

## 2023-09-26 ENCOUNTER — TELEPHONE (OUTPATIENT)
Dept: PRIMARY CARE | Facility: CLINIC | Age: 68
End: 2023-09-26
Payer: COMMERCIAL

## 2023-09-26 NOTE — TELEPHONE ENCOUNTER
PATIENT CALLED TO NOTIFY HIS APPOINTMENT WITH DR ANTONY HAD TO BE RESCHEDULED SECONDARY TO HER BEING ON LEAVE OF ABSENCE. WISHES TO HAVE A REFERRAL BACK TO DR. SIMON. WILL PROPOSE AND FORWARD RECORDS FOR THEIR OFFICE TO SCHEDULE AND CALL PATIENT.

## 2023-11-17 DIAGNOSIS — Z85.038 PERSONAL HISTORY OF COLON CANCER: ICD-10-CM

## 2023-11-17 DIAGNOSIS — Z12.11 SPECIAL SCREENING FOR MALIGNANT NEOPLASM OF COLON: ICD-10-CM

## 2023-11-30 ENCOUNTER — APPOINTMENT (OUTPATIENT)
Dept: PRIMARY CARE | Facility: CLINIC | Age: 68
End: 2023-11-30
Payer: COMMERCIAL

## 2023-12-14 ENCOUNTER — APPOINTMENT (OUTPATIENT)
Dept: PRIMARY CARE | Facility: CLINIC | Age: 68
End: 2023-12-14
Payer: COMMERCIAL

## 2023-12-21 ENCOUNTER — APPOINTMENT (OUTPATIENT)
Dept: OPERATING ROOM | Facility: HOSPITAL | Age: 68
End: 2023-12-21
Payer: COMMERCIAL

## 2023-12-26 ENCOUNTER — APPOINTMENT (OUTPATIENT)
Dept: RADIOLOGY | Facility: CLINIC | Age: 68
End: 2023-12-26
Payer: COMMERCIAL

## 2023-12-26 ENCOUNTER — ANCILLARY PROCEDURE (OUTPATIENT)
Dept: RADIOLOGY | Facility: CLINIC | Age: 68
End: 2023-12-26
Payer: COMMERCIAL

## 2023-12-26 ENCOUNTER — LAB (OUTPATIENT)
Dept: LAB | Facility: LAB | Age: 68
End: 2023-12-26
Payer: COMMERCIAL

## 2023-12-26 DIAGNOSIS — E55.9 VITAMIN D DEFICIENCY, UNSPECIFIED: ICD-10-CM

## 2023-12-26 DIAGNOSIS — Z79.1 LONG TERM (CURRENT) USE OF NON-STEROIDAL ANTI-INFLAMMATORIES (NSAID): ICD-10-CM

## 2023-12-26 DIAGNOSIS — Z79.82 LONG TERM (CURRENT) USE OF ASPIRIN: ICD-10-CM

## 2023-12-26 DIAGNOSIS — Z87.39 PERSONAL HISTORY OF OTHER DISEASES OF THE MUSCULOSKELETAL SYSTEM AND CONNECTIVE TISSUE: ICD-10-CM

## 2023-12-26 DIAGNOSIS — M79.642 PAIN IN LEFT HAND: ICD-10-CM

## 2023-12-26 DIAGNOSIS — M79.641 PAIN IN RIGHT HAND: ICD-10-CM

## 2023-12-26 DIAGNOSIS — M89.9 DISORDER OF BONE, UNSPECIFIED: ICD-10-CM

## 2023-12-26 DIAGNOSIS — M25.531 PAIN IN RIGHT WRIST: ICD-10-CM

## 2023-12-26 DIAGNOSIS — Z82.61 FAMILY HISTORY OF ARTHRITIS: ICD-10-CM

## 2023-12-26 DIAGNOSIS — R53.83 OTHER FATIGUE: ICD-10-CM

## 2023-12-26 DIAGNOSIS — M79.641 PAIN IN RIGHT HAND: Primary | ICD-10-CM

## 2023-12-26 DIAGNOSIS — Z96.653 PRESENCE OF ARTIFICIAL KNEE JOINT, BILATERAL: ICD-10-CM

## 2023-12-26 DIAGNOSIS — M25.532 PAIN IN LEFT WRIST: ICD-10-CM

## 2023-12-26 DIAGNOSIS — Z79.899 OTHER LONG TERM (CURRENT) DRUG THERAPY: ICD-10-CM

## 2023-12-26 DIAGNOSIS — I25.810 CORONARY ARTERY DISEASE INVOLVING AUTOLOGOUS ARTERY CORONARY BYPASS GRAFT WITHOUT ANGINA PECTORIS: ICD-10-CM

## 2023-12-26 DIAGNOSIS — E78.5 HYPERLIPIDEMIA, UNSPECIFIED: ICD-10-CM

## 2023-12-26 DIAGNOSIS — I10 ESSENTIAL (PRIMARY) HYPERTENSION: ICD-10-CM

## 2023-12-26 DIAGNOSIS — R73.02 IGT (IMPAIRED GLUCOSE TOLERANCE): ICD-10-CM

## 2023-12-26 DIAGNOSIS — M94.9 DISORDER OF CARTILAGE, UNSPECIFIED: ICD-10-CM

## 2023-12-26 LAB
ALBUMIN SERPL BCP-MCNC: 4.3 G/DL (ref 3.4–5)
ALP SERPL-CCNC: 96 U/L (ref 33–136)
ALT SERPL W P-5'-P-CCNC: 14 U/L (ref 10–52)
ANION GAP SERPL CALC-SCNC: 12 MMOL/L (ref 10–20)
AST SERPL W P-5'-P-CCNC: 18 U/L (ref 9–39)
BASOPHILS # BLD AUTO: 0.06 X10*3/UL (ref 0–0.1)
BASOPHILS NFR BLD AUTO: 0.8 %
BILIRUB SERPL-MCNC: 0.9 MG/DL (ref 0–1.2)
BUN SERPL-MCNC: 15 MG/DL (ref 6–23)
CALCIUM SERPL-MCNC: 9.6 MG/DL (ref 8.6–10.3)
CHLORIDE SERPL-SCNC: 101 MMOL/L (ref 98–107)
CHOLEST SERPL-MCNC: 126 MG/DL (ref 0–199)
CHOLESTEROL/HDL RATIO: 3
CK SERPL-CCNC: 72 U/L (ref 0–325)
CO2 SERPL-SCNC: 31 MMOL/L (ref 21–32)
CREAT SERPL-MCNC: 0.83 MG/DL (ref 0.5–1.3)
CRP SERPL-MCNC: 1.02 MG/DL
EOSINOPHIL # BLD AUTO: 0.1 X10*3/UL (ref 0–0.7)
EOSINOPHIL NFR BLD AUTO: 1.4 %
ERYTHROCYTE [DISTWIDTH] IN BLOOD BY AUTOMATED COUNT: 16.8 % (ref 11.5–14.5)
ERYTHROCYTE [SEDIMENTATION RATE] IN BLOOD BY WESTERGREN METHOD: 44 MM/H (ref 0–20)
EST. AVERAGE GLUCOSE BLD GHB EST-MCNC: 148 MG/DL
GFR SERPL CREATININE-BSD FRML MDRD: >90 ML/MIN/1.73M*2
GLUCOSE SERPL-MCNC: 95 MG/DL (ref 74–99)
HBA1C MFR BLD: 6.8 %
HCT VFR BLD AUTO: 45.5 % (ref 41–52)
HDLC SERPL-MCNC: 42 MG/DL
HGB BLD-MCNC: 14 G/DL (ref 13.5–17.5)
IMM GRANULOCYTES # BLD AUTO: 0.02 X10*3/UL (ref 0–0.7)
IMM GRANULOCYTES NFR BLD AUTO: 0.3 % (ref 0–0.9)
LDLC SERPL CALC-MCNC: 64 MG/DL
LYMPHOCYTES # BLD AUTO: 1.82 X10*3/UL (ref 1.2–4.8)
LYMPHOCYTES NFR BLD AUTO: 25.1 %
MAGNESIUM SERPL-MCNC: 2.03 MG/DL (ref 1.6–2.4)
MCH RBC QN AUTO: 24.4 PG (ref 26–34)
MCHC RBC AUTO-ENTMCNC: 30.8 G/DL (ref 32–36)
MCV RBC AUTO: 79 FL (ref 80–100)
MONOCYTES # BLD AUTO: 0.61 X10*3/UL (ref 0.1–1)
MONOCYTES NFR BLD AUTO: 8.4 %
NEUTROPHILS # BLD AUTO: 4.64 X10*3/UL (ref 1.2–7.7)
NEUTROPHILS NFR BLD AUTO: 64 %
NON HDL CHOLESTEROL: 84 MG/DL (ref 0–149)
NRBC BLD-RTO: 0 /100 WBCS (ref 0–0)
PLATELET # BLD AUTO: 247 X10*3/UL (ref 150–450)
POTASSIUM SERPL-SCNC: 3.9 MMOL/L (ref 3.5–5.3)
PROT SERPL-MCNC: 6.9 G/DL (ref 6.4–8.2)
RBC # BLD AUTO: 5.74 X10*6/UL (ref 4.5–5.9)
SODIUM SERPL-SCNC: 140 MMOL/L (ref 136–145)
TRIGL SERPL-MCNC: 99 MG/DL (ref 0–149)
URATE SERPL-MCNC: 7.5 MG/DL (ref 4–7.5)
VLDL: 20 MG/DL (ref 0–40)
WBC # BLD AUTO: 7.3 X10*3/UL (ref 4.4–11.3)

## 2023-12-26 PROCEDURE — 86235 NUCLEAR ANTIGEN ANTIBODY: CPT

## 2023-12-26 PROCEDURE — 86431 RHEUMATOID FACTOR QUANT: CPT

## 2023-12-26 PROCEDURE — 86225 DNA ANTIBODY NATIVE: CPT

## 2023-12-26 PROCEDURE — 82550 ASSAY OF CK (CPK): CPT

## 2023-12-26 PROCEDURE — 73120 X-RAY EXAM OF HAND: CPT | Mod: BILATERAL PROCEDURE | Performed by: RADIOLOGY

## 2023-12-26 PROCEDURE — 80053 COMPREHEN METABOLIC PANEL: CPT

## 2023-12-26 PROCEDURE — 83036 HEMOGLOBIN GLYCOSYLATED A1C: CPT

## 2023-12-26 PROCEDURE — 87476 LYME DIS DNA AMP PROBE: CPT

## 2023-12-26 PROCEDURE — 73120 X-RAY EXAM OF HAND: CPT | Mod: 50

## 2023-12-26 PROCEDURE — 85025 COMPLETE CBC W/AUTO DIFF WBC: CPT

## 2023-12-26 PROCEDURE — 84155 ASSAY OF PROTEIN SERUM: CPT

## 2023-12-26 PROCEDURE — 80061 LIPID PANEL: CPT

## 2023-12-26 PROCEDURE — 84165 PROTEIN E-PHORESIS SERUM: CPT

## 2023-12-26 PROCEDURE — 85652 RBC SED RATE AUTOMATED: CPT

## 2023-12-26 PROCEDURE — 82043 UR ALBUMIN QUANTITATIVE: CPT

## 2023-12-26 PROCEDURE — 73100 X-RAY EXAM OF WRIST: CPT | Mod: BILATERAL PROCEDURE | Performed by: RADIOLOGY

## 2023-12-26 PROCEDURE — 73100 X-RAY EXAM OF WRIST: CPT | Mod: 50

## 2023-12-26 PROCEDURE — 83516 IMMUNOASSAY NONANTIBODY: CPT

## 2023-12-26 PROCEDURE — 83735 ASSAY OF MAGNESIUM: CPT

## 2023-12-26 PROCEDURE — 86320 SERUM IMMUNOELECTROPHORESIS: CPT | Performed by: INTERNAL MEDICINE

## 2023-12-26 PROCEDURE — 82570 ASSAY OF URINE CREATININE: CPT

## 2023-12-26 PROCEDURE — 86200 CCP ANTIBODY: CPT

## 2023-12-26 PROCEDURE — 36415 COLL VENOUS BLD VENIPUNCTURE: CPT

## 2023-12-26 PROCEDURE — 84550 ASSAY OF BLOOD/URIC ACID: CPT

## 2023-12-26 PROCEDURE — 86334 IMMUNOFIX E-PHORESIS SERUM: CPT

## 2023-12-26 PROCEDURE — 86140 C-REACTIVE PROTEIN: CPT

## 2023-12-26 PROCEDURE — 84165 PROTEIN E-PHORESIS SERUM: CPT | Performed by: INTERNAL MEDICINE

## 2023-12-26 PROCEDURE — 86038 ANTINUCLEAR ANTIBODIES: CPT

## 2023-12-27 LAB
ANA PATTERN: ABNORMAL
ANA SER QL HEP2 SUBST: POSITIVE
ANA TITR SER IF: ABNORMAL {TITER}
CCP IGG SERPL-ACNC: <1 U/ML
CENTROMERE B AB SER-ACNC: <0.2 AI
CHROMATIN AB SERPL-ACNC: <0.2 AI
CREAT UR-MCNC: 100.5 MG/DL (ref 20–370)
DSDNA AB SER-ACNC: <1 IU/ML
ENA JO1 AB SER QL IA: <0.2 AI
ENA RNP AB SER IA-ACNC: 0.3 AI
ENA SCL70 AB SER QL IA: <0.2 AI
ENA SM AB SER IA-ACNC: <0.2 AI
ENA SM+RNP AB SER QL IA: <0.2 AI
ENA SS-A AB SER IA-ACNC: <0.2 AI
ENA SS-B AB SER IA-ACNC: <0.2 AI
MICROALBUMIN UR-MCNC: 24.9 MG/L
MICROALBUMIN/CREAT UR: 24.8 UG/MG CREAT
PROT SERPL-MCNC: 7.1 G/DL (ref 6.4–8.2)
RHEUMATOID FACT SER NEPH-ACNC: <10 IU/ML (ref 0–15)
RIBOSOMAL P AB SER-ACNC: <0.2 AI
TTG IGA SER IA-ACNC: <1 U/ML

## 2023-12-29 LAB
B BURGDOR DNA SPEC QL NAA+PROBE: NOT DETECTED
SPECIMEN SOURCE: NORMAL

## 2024-01-01 LAB
ALBUMIN: 3.9 G/DL (ref 3.4–5)
ALPHA 1 GLOBULIN: 0.3 G/DL (ref 0.2–0.6)
ALPHA 2 GLOBULIN: 0.9 G/DL (ref 0.4–1.1)
BETA GLOBULIN: 1 G/DL (ref 0.5–1.2)
GAMMA GLOBULIN: 0.9 G/DL (ref 0.5–1.4)
IMMUNOFIXATION COMMENT: NORMAL
PATH REVIEW - SERUM IMMUNOFIXATION: NORMAL
PATH REVIEW-SERUM PROTEIN ELECTROPHORESIS: NORMAL
PROTEIN ELECTROPHORESIS COMMENT: NORMAL

## 2024-01-04 VITALS — BODY MASS INDEX: 48.75 KG/M2 | WEIGHT: 310.63 LBS | HEIGHT: 67 IN

## 2024-01-04 RX ORDER — ASPIRIN 81 MG/1
162 TABLET ORAL DAILY
COMMUNITY
Start: 2023-12-18

## 2024-01-04 RX ORDER — CELECOXIB 200 MG/1
200 CAPSULE ORAL
COMMUNITY
Start: 2023-12-18

## 2024-01-08 ENCOUNTER — HOSPITAL ENCOUNTER (OUTPATIENT)
Dept: OPERATING ROOM | Facility: HOSPITAL | Age: 69
Setting detail: OUTPATIENT SURGERY
Discharge: HOME | End: 2024-01-08
Payer: COMMERCIAL

## 2024-01-08 ENCOUNTER — ANESTHESIA EVENT (OUTPATIENT)
Dept: OPERATING ROOM | Facility: HOSPITAL | Age: 69
End: 2024-01-08
Payer: COMMERCIAL

## 2024-01-08 ENCOUNTER — ANESTHESIA (OUTPATIENT)
Dept: OPERATING ROOM | Facility: HOSPITAL | Age: 69
End: 2024-01-08
Payer: COMMERCIAL

## 2024-01-08 VITALS
OXYGEN SATURATION: 98 % | TEMPERATURE: 97.9 F | SYSTOLIC BLOOD PRESSURE: 135 MMHG | RESPIRATION RATE: 20 BRPM | DIASTOLIC BLOOD PRESSURE: 69 MMHG | HEART RATE: 64 BPM

## 2024-01-08 DIAGNOSIS — Z85.038 PERSONAL HISTORY OF COLON CANCER: ICD-10-CM

## 2024-01-08 DIAGNOSIS — Z12.11 SPECIAL SCREENING FOR MALIGNANT NEOPLASM OF COLON: ICD-10-CM

## 2024-01-08 LAB — GLUCOSE BLD MANUAL STRIP-MCNC: 109 MG/DL (ref 74–99)

## 2024-01-08 PROCEDURE — 2500000004 HC RX 250 GENERAL PHARMACY W/ HCPCS (ALT 636 FOR OP/ED): Performed by: ANESTHESIOLOGY

## 2024-01-08 PROCEDURE — 3700000002 HC GENERAL ANESTHESIA TIME - EACH INCREMENTAL 1 MINUTE: Performed by: INTERNAL MEDICINE

## 2024-01-08 PROCEDURE — 3600000002 HC OR TIME - INITIAL BASE CHARGE - PROCEDURE LEVEL TWO: Performed by: INTERNAL MEDICINE

## 2024-01-08 PROCEDURE — 82947 ASSAY GLUCOSE BLOOD QUANT: CPT

## 2024-01-08 PROCEDURE — 7100000010 HC PHASE TWO TIME - EACH INCREMENTAL 1 MINUTE: Performed by: INTERNAL MEDICINE

## 2024-01-08 PROCEDURE — 45378 DIAGNOSTIC COLONOSCOPY: CPT | Performed by: INTERNAL MEDICINE

## 2024-01-08 PROCEDURE — 2500000005 HC RX 250 GENERAL PHARMACY W/O HCPCS: Performed by: ANESTHESIOLOGY

## 2024-01-08 PROCEDURE — 7100000009 HC PHASE TWO TIME - INITIAL BASE CHARGE: Performed by: INTERNAL MEDICINE

## 2024-01-08 PROCEDURE — 3600000007 HC OR TIME - EACH INCREMENTAL 1 MINUTE - PROCEDURE LEVEL TWO: Performed by: INTERNAL MEDICINE

## 2024-01-08 PROCEDURE — 3700000001 HC GENERAL ANESTHESIA TIME - INITIAL BASE CHARGE: Performed by: INTERNAL MEDICINE

## 2024-01-08 RX ORDER — PROPOFOL 10 MG/ML
INJECTION, EMULSION INTRAVENOUS AS NEEDED
Status: DISCONTINUED | OUTPATIENT
Start: 2024-01-08 | End: 2024-01-08

## 2024-01-08 RX ORDER — SODIUM CHLORIDE, SODIUM LACTATE, POTASSIUM CHLORIDE, CALCIUM CHLORIDE 600; 310; 30; 20 MG/100ML; MG/100ML; MG/100ML; MG/100ML
125 INJECTION, SOLUTION INTRAVENOUS CONTINUOUS
OUTPATIENT
Start: 2024-01-08

## 2024-01-08 RX ORDER — HYDROMORPHONE HYDROCHLORIDE 2 MG/ML
0.5 INJECTION, SOLUTION INTRAMUSCULAR; INTRAVENOUS; SUBCUTANEOUS EVERY 5 MIN PRN
OUTPATIENT
Start: 2024-01-08

## 2024-01-08 RX ORDER — LIDOCAINE HYDROCHLORIDE 10 MG/ML
INJECTION, SOLUTION EPIDURAL; INFILTRATION; INTRACAUDAL; PERINEURAL AS NEEDED
Status: DISCONTINUED | OUTPATIENT
Start: 2024-01-08 | End: 2024-01-08

## 2024-01-08 RX ORDER — SODIUM CHLORIDE, SODIUM LACTATE, POTASSIUM CHLORIDE, CALCIUM CHLORIDE 600; 310; 30; 20 MG/100ML; MG/100ML; MG/100ML; MG/100ML
75 INJECTION, SOLUTION INTRAVENOUS CONTINUOUS
Status: DISCONTINUED | OUTPATIENT
Start: 2024-01-08 | End: 2024-01-09 | Stop reason: HOSPADM

## 2024-01-08 RX ORDER — ONDANSETRON HYDROCHLORIDE 2 MG/ML
4 INJECTION, SOLUTION INTRAVENOUS
OUTPATIENT
Start: 2024-01-08

## 2024-01-08 RX ORDER — SODIUM CHLORIDE, SODIUM LACTATE, POTASSIUM CHLORIDE, CALCIUM CHLORIDE 600; 310; 30; 20 MG/100ML; MG/100ML; MG/100ML; MG/100ML
125 INJECTION, SOLUTION INTRAVENOUS CONTINUOUS
Status: DISCONTINUED | OUTPATIENT
Start: 2024-01-08 | End: 2024-01-09 | Stop reason: HOSPADM

## 2024-01-08 RX ADMIN — PROPOFOL 50 MG: 10 INJECTION, EMULSION INTRAVENOUS at 07:44

## 2024-01-08 RX ADMIN — LIDOCAINE HYDROCHLORIDE 5 ML: 10 INJECTION, SOLUTION EPIDURAL; INFILTRATION; INTRACAUDAL; PERINEURAL at 07:33

## 2024-01-08 RX ADMIN — PROPOFOL 100 MG: 10 INJECTION, EMULSION INTRAVENOUS at 07:32

## 2024-01-08 RX ADMIN — SODIUM CHLORIDE, POTASSIUM CHLORIDE, SODIUM LACTATE AND CALCIUM CHLORIDE 75 ML/HR: 600; 310; 30; 20 INJECTION, SOLUTION INTRAVENOUS at 07:27

## 2024-01-08 RX ADMIN — PROPOFOL 100 MG: 10 INJECTION, EMULSION INTRAVENOUS at 07:37

## 2024-01-08 SDOH — HEALTH STABILITY: MENTAL HEALTH: CURRENT SMOKER: 0

## 2024-01-08 ASSESSMENT — PAIN - FUNCTIONAL ASSESSMENT: PAIN_FUNCTIONAL_ASSESSMENT: 0-10

## 2024-01-08 ASSESSMENT — PAIN SCALES - GENERAL
PAINLEVEL_OUTOF10: 0 - NO PAIN
PAIN_LEVEL: 3
PAINLEVEL_OUTOF10: 0 - NO PAIN
PAINLEVEL_OUTOF10: 0 - NO PAIN

## 2024-01-08 ASSESSMENT — COLUMBIA-SUICIDE SEVERITY RATING SCALE - C-SSRS
1. IN THE PAST MONTH, HAVE YOU WISHED YOU WERE DEAD OR WISHED YOU COULD GO TO SLEEP AND NOT WAKE UP?: NO
6. HAVE YOU EVER DONE ANYTHING, STARTED TO DO ANYTHING, OR PREPARED TO DO ANYTHING TO END YOUR LIFE?: NO
2. HAVE YOU ACTUALLY HAD ANY THOUGHTS OF KILLING YOURSELF?: NO

## 2024-01-08 NOTE — H&P
History Of Present Illness  Joseph Isaac is a 69 y.o. male presenting with colon cancer screening.     Past Medical History  Past Medical History:   Diagnosis Date    Arthritis     Cancer (CMS/[facility]) 2025    CHF (congestive heart failure) (CMS/[facility]) March 2023    Diabetes mellitus (CMS/[facility])     GERD (gastroesophageal reflux disease)     Hypertension     Sleep apnea        Surgical History  Past Surgical History:   Procedure Laterality Date    CARDIAC SURGERY      COLON SURGERY      COLONOSCOPY      CORONARY ARTERY BYPASS GRAFT  04/2023    KNEE ARTHROSCOPY W/ DEBRIDEMENT Right     TOTAL KNEE ARTHROPLASTY Left 12/02/2022    TOTAL KNEE ARTHROPLASTY Right 07/15/2022        Social History  He reports that he has never smoked. He has never used smokeless tobacco. He reports that he does not currently use alcohol. He reports that he does not use drugs.    Family History  Family History   Problem Relation Name Age of Onset    Hypertension Mother Edith Magill     Cancer Maternal Grandfather Kaiser Hoangwak     Heart disease Paternal Grandfather Joseph Isaac Sr.         Allergies  Patient has no known allergies.    Review of Systems     Physical Exam     Last Recorded Vitals  Blood pressure (!) 162/98, pulse 70, temperature 36.4 °C (97.5 °F), resp. rate 20, SpO2 95 %.    Relevant Results             Assessment/Plan   Active Problems:  There are no active Hospital Problems.      Problem List Items Addressed This Visit    None  Visit Diagnoses       Special screening for malignant neoplasm of colon        Relevant Orders    Colonoscopy Screening; High Risk Patient    Personal history of colon cancer        Relevant Orders    Colonoscopy Screening; High Risk Patient                 I spent  minutes in the professional and overall care of this patient.      Jeremy Anthony DO

## 2024-01-08 NOTE — ANESTHESIA POSTPROCEDURE EVALUATION
Patient: Joseph Isaac    Procedure Summary       Date: 01/08/24 Room / Location: [facility] OR    Anesthesia Start: 0731 Anesthesia Stop: 0755    Procedure: COLONOSCOPY Diagnosis:       Special screening for malignant neoplasm of colon      Personal history of colon cancer    Scheduled Providers: Jeremy Anthony DO; Sy Diaz MD Responsible Provider: Sy Diaz MD    Anesthesia Type: MAC ASA Status: 3            Anesthesia Type: MAC    Vitals Value Taken Time   /69 01/08/24 0830   Temp 36.6 °C (97.9 °F) 01/08/24 0800   Pulse 64 01/08/24 0830   Resp 20 01/08/24 0830   SpO2 98 % 01/08/24 0830       Anesthesia Post Evaluation    Patient location during evaluation: PACU  Patient participation: complete - patient participated  Level of consciousness: awake and alert  Pain score: 3  Pain management: adequate  Multimodal analgesia pain management approach  Airway patency: patent  Cardiovascular status: acceptable  Respiratory status: acceptable  Hydration status: acceptable  Postoperative Nausea and Vomiting: none        No notable events documented.

## 2024-01-08 NOTE — DISCHARGE INSTRUCTIONS
Patient Instructions after a Colonoscopy      The anesthetics, sedatives or narcotics which were given to you today will be acting in your body for the next 24 hours, so you might feel a little sleepy or groggy.  This feeling should slowly wear off. Carefully read and follow the instructions.     You received sedation today:  - Do not drive or operate any machinery or power tools of any kind.   - No alcoholic beverages today, not even beer or wine.  - Do not make any important decisions or sign any legal documents.  - No over the counter medications that contain alcohol or that may cause drowsiness.  - Do not make any important decisions or sign any legal documents.    While it is common to experience mild to moderate abdominal distention, gas, or belching after your procedure, if any of these symptoms occur following discharge from the GI Lab or within one week of having your procedure, call the [facility] to be advised whether a visit to your nearest Urgent Care or Emergency Department is indicated.  Take this paper with you if you go.     - If you develop an allergic reaction to the medications that were given during your procedure such as difficulty breathing, rash, hives, severe nausea, vomiting or lightheadedness.  - If you experience chest pain, shortness of breath, severe abdominal pain, fevers and chills.  -If you develop signs and symptoms of bleeding such as blood in your spit, if your stools turn black, tarry, or bloody  - If you have not urinated within 8 hours following your procedure.  - If your IV site becomes painful, red, inflamed, or looks infected.    If you received a biopsy/polypectomy/sphincterotomy the following instructions apply below:    __ Do not use Aspirin containing products, non-steroidal medications or anti-coagulants for one week following your procedure. (Examples of these types of medications are: Advil, Arthrotec, Aleve, Coumadin, Ecotrin, Heparin, Ibuprofen,  Indocin, Motrin, Naprosyn, Nuprin, Plavix, Vioxx, and Voltarin, or their generic forms.  This list is not all-inclusive.  Check with your physician or pharmacist before resuming medications.)   __ Eat a soft diet today.  Avoid foods that are poorly digested for the next 24 hours.  These foods would include: nuts, beans, lettuce, red meats, and fried foods. Start with liquids and advance your diet as tolerated, gradually work up to eating solids.   __ Do not have a Barium Study or Enema for one week.    Your physician recommends the additional following instructions:    -You have a contact number available for emergencies. The signs and symptoms of potential delayed complications were discussed with you. You may return to normal activities tomorrow.  -Resume your previous diet.  -Continue your present medications.   -We are waiting for your pathology results.  -Your physician has recommended a repeat colonoscopy (date to be determined after pending pathology results are reviewed) for surveillance based on pathology results.  -The findings and recommendations have been discussed with you.  -The findings and recommendations were discussed with your family.  - Please see Medication Reconciliation Form for new medication/medications prescribed.       If you experience any problems or have any questions following discharge from the GI Lab, please call:        Nurse Signature                                                                        Date___________________                                                                            Patient/Responsible Party Signature                                        Date___________________

## 2024-01-11 ENCOUNTER — LAB (OUTPATIENT)
Dept: LAB | Facility: LAB | Age: 69
End: 2024-01-11
Payer: COMMERCIAL

## 2024-01-11 ENCOUNTER — OFFICE VISIT (OUTPATIENT)
Dept: PRIMARY CARE | Facility: CLINIC | Age: 69
End: 2024-01-11
Payer: COMMERCIAL

## 2024-01-11 VITALS
SYSTOLIC BLOOD PRESSURE: 140 MMHG | WEIGHT: 315 LBS | OXYGEN SATURATION: 95 % | BODY MASS INDEX: 49.44 KG/M2 | HEART RATE: 55 BPM | DIASTOLIC BLOOD PRESSURE: 90 MMHG | HEIGHT: 67 IN

## 2024-01-11 DIAGNOSIS — R73.02 IGT (IMPAIRED GLUCOSE TOLERANCE): ICD-10-CM

## 2024-01-11 DIAGNOSIS — I25.810 CORONARY ARTERY DISEASE INVOLVING AUTOLOGOUS ARTERY CORONARY BYPASS GRAFT WITHOUT ANGINA PECTORIS: Primary | ICD-10-CM

## 2024-01-11 DIAGNOSIS — K21.00 GASTROESOPHAGEAL REFLUX DISEASE WITH ESOPHAGITIS WITHOUT HEMORRHAGE: ICD-10-CM

## 2024-01-11 DIAGNOSIS — D58.2 HEMOGLOBINOPATHY (CMS-HCC): ICD-10-CM

## 2024-01-11 DIAGNOSIS — E11.9 TYPE 2 DIABETES MELLITUS WITHOUT COMPLICATION, WITHOUT LONG-TERM CURRENT USE OF INSULIN (MULTI): ICD-10-CM

## 2024-01-11 DIAGNOSIS — E66.01 SEVERE OBESITY (BMI >= 40) (MULTI): ICD-10-CM

## 2024-01-11 DIAGNOSIS — R97.20 ELEVATED PROSTATE SPECIFIC ANTIGEN (PSA): Primary | ICD-10-CM

## 2024-01-11 DIAGNOSIS — I10 ESSENTIAL HYPERTENSION: ICD-10-CM

## 2024-01-11 LAB
FERRITIN SERPL-MCNC: 45 NG/ML (ref 20–300)
IRON SATN MFR SERPL: 11 % (ref 25–45)
IRON SERPL-MCNC: 45 UG/DL (ref 35–150)
PSA SERPL-MCNC: 4.8 NG/ML
TIBC SERPL-MCNC: 413 UG/DL (ref 240–445)
UIBC SERPL-MCNC: 368 UG/DL (ref 110–370)
VIT B12 SERPL-MCNC: 2456 PG/ML (ref 211–911)

## 2024-01-11 PROCEDURE — 82728 ASSAY OF FERRITIN: CPT

## 2024-01-11 PROCEDURE — 3080F DIAST BP >= 90 MM HG: CPT | Performed by: FAMILY MEDICINE

## 2024-01-11 PROCEDURE — 4010F ACE/ARB THERAPY RXD/TAKEN: CPT | Performed by: FAMILY MEDICINE

## 2024-01-11 PROCEDURE — 82607 VITAMIN B-12: CPT

## 2024-01-11 PROCEDURE — 83550 IRON BINDING TEST: CPT

## 2024-01-11 PROCEDURE — 99214 OFFICE O/P EST MOD 30 MIN: CPT | Performed by: FAMILY MEDICINE

## 2024-01-11 PROCEDURE — 36415 COLL VENOUS BLD VENIPUNCTURE: CPT

## 2024-01-11 PROCEDURE — 1126F AMNT PAIN NOTED NONE PRSNT: CPT | Performed by: FAMILY MEDICINE

## 2024-01-11 PROCEDURE — 3077F SYST BP >= 140 MM HG: CPT | Performed by: FAMILY MEDICINE

## 2024-01-11 PROCEDURE — 1160F RVW MEDS BY RX/DR IN RCRD: CPT | Performed by: FAMILY MEDICINE

## 2024-01-11 PROCEDURE — 83540 ASSAY OF IRON: CPT

## 2024-01-11 PROCEDURE — 84153 ASSAY OF PSA TOTAL: CPT

## 2024-01-11 PROCEDURE — 1159F MED LIST DOCD IN RCRD: CPT | Performed by: FAMILY MEDICINE

## 2024-01-11 PROCEDURE — 3008F BODY MASS INDEX DOCD: CPT | Performed by: FAMILY MEDICINE

## 2024-01-11 PROCEDURE — 1036F TOBACCO NON-USER: CPT | Performed by: FAMILY MEDICINE

## 2024-01-11 NOTE — PROGRESS NOTES
"Subjective   Patient ID: Joseph Isaac is a 69 y.o. male who presents for Follow-up (3 mo).    HPI had  arthroscope right knee to remove some scarring.  Has TKRs bilaterally  Had colon,  MGF had colon cancer at 70s, dictation list 10 but will recc 5yr  Saw boogie.  CAD has had no angina has improved activity tolerance.  HTN-Takes and tolerates meds without side effects. No alcohol. no tobacco. no exercise. low salt.  Reviewed recommendation for 150 minutes of exercise per week including 2 days of weight training if over age 50  GERD-Takes PPI daily with no breakthrough symptoms.  Reviewed dietary, caffeine, tobacco, alcohol, and NSAID avoidance. No dyspepsia, dysphagia, reflux, melena, or abdominal pain.  Review of labs shows that he is not anemic but has hyper micro indices likely represents a thalassemia trait.  Diabetes is enjoying excellent control.  With A1c at 6.8.  His climbed a little bit and picked up a few pounds and he will redouble nonpharmacologic efforts  Review of Systems  General-no fatigue weight to within 10 pounds  ENT no problems with vision swallowing  Cardiac no chest pains palpitations change in exercise tolerance or capacity  Pulmonary no cough shortness of breath  GI no heartburn or abdominal pain  Musculoskeletal multiple joint pains  Objective   /90   Pulse 55   Ht 1.702 m (5' 7\")   Wt 144 kg (318 lb)   SpO2 95%   BMI 49.81 kg/m²     Physical Exam  General:  Alert, No acute distress. Appears stated age  Eye:  Pupils are equal, round and reactive to light, Extraocular movements are intact, Normal conjunctiva.    Neck:  Supple, Non-tender, No carotid bruit, No jugular venous distention, No lymphadenopathy, No thyromegaly.    Respiratory:  Lungs are clear to auscultation, Respirations are non-labored, Breath sounds are equal.    Cardiovascular:  Normal rate, Regular rhythm, No murmur.    Gastrointestinal:  Soft, Non-tender, No organomegaly. No solid or pulsatile " mass  Integumentary:  Warm, Dry. No concerning lesions on exposed areas  Neurologic:  Alert, Oriented.  Gross and fine motor intact, CN 2-12 intact  Psychiatric:  Cooperative, Appropriate mood & affect.  Assessment/Plan   Problem List Items Addressed This Visit             ICD-10-CM    Gastroesophageal reflux disease K21.9    Relevant Orders    Follow Up In Primary Care - Established    Essential hypertension I10    Relevant Orders    Follow Up In Primary Care - Established    IGT (impaired glucose tolerance) R73.02    Relevant Orders    Follow Up In Primary Care - Established    Severe obesity (BMI >= 40) (CMS/[facility]) E66.01    Coronary artery disease involving autologous artery coronary bypass graft without angina pectoris - Primary I25.810    Relevant Orders    Follow Up In Primary Care - Established     Other Visit Diagnoses         Codes    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

## 2024-01-31 ENCOUNTER — TELEPHONE (OUTPATIENT)
Dept: PRIMARY CARE | Facility: CLINIC | Age: 69
End: 2024-01-31
Payer: COMMERCIAL

## 2024-01-31 NOTE — TELEPHONE ENCOUNTER
PATIENT TOLD TO STOP ATORVASTATIN PER DR SIMON.   EFFECTS    GEOVANY TEST,   PATIENT STOPPED ATORVASTATIN.    CAN YOU REPLACE WITH SOMETHING BONNY ?      [facility] [address]

## 2024-02-19 DIAGNOSIS — I25.810 CORONARY ARTERY DISEASE INVOLVING AUTOLOGOUS ARTERY CORONARY BYPASS GRAFT WITHOUT ANGINA PECTORIS: ICD-10-CM

## 2024-02-19 RX ORDER — AMLODIPINE BESYLATE 5 MG/1
5 TABLET ORAL DAILY
Qty: 30 TABLET | Refills: 0 | Status: SHIPPED | OUTPATIENT
Start: 2024-02-19 | End: 2024-04-03

## 2024-03-25 ENCOUNTER — APPOINTMENT (OUTPATIENT)
Dept: UROLOGY | Facility: CLINIC | Age: 69
End: 2024-03-25
Payer: COMMERCIAL

## 2024-04-02 DIAGNOSIS — I25.810 CORONARY ARTERY DISEASE INVOLVING AUTOLOGOUS ARTERY CORONARY BYPASS GRAFT WITHOUT ANGINA PECTORIS: ICD-10-CM

## 2024-04-03 RX ORDER — AMLODIPINE BESYLATE 5 MG/1
5 TABLET ORAL DAILY
Qty: 90 TABLET | Refills: 3 | Status: SHIPPED | OUTPATIENT
Start: 2024-04-03 | End: 2025-04-03

## 2024-04-08 ENCOUNTER — TELEPHONE (OUTPATIENT)
Dept: PRIMARY CARE | Facility: CLINIC | Age: 69
End: 2024-04-08
Payer: COMMERCIAL

## 2024-04-08 NOTE — TELEPHONE ENCOUNTER
Pt requests phone call to discuss possibly starting Wegovy to treat multiple conditions, will come in for visit or do virtual if preferred by you.

## 2024-04-11 DIAGNOSIS — E66.01 MORBID OBESITY WITH BMI OF 45.0-49.9, ADULT (MULTI): Primary | ICD-10-CM

## 2024-04-11 RX ORDER — SEMAGLUTIDE 0.25 MG/.5ML
0.25 INJECTION, SOLUTION SUBCUTANEOUS
Qty: 2 ML | Refills: 11 | Status: SHIPPED | OUTPATIENT
Start: 2024-04-14 | End: 2025-04-14

## 2024-04-12 ENCOUNTER — TELEPHONE (OUTPATIENT)
Dept: PRIMARY CARE | Facility: CLINIC | Age: 69
End: 2024-04-12
Payer: COMMERCIAL

## 2024-04-12 DIAGNOSIS — E66.01 MORBID OBESITY WITH BMI OF 45.0-49.9, ADULT (MULTI): Primary | ICD-10-CM

## 2024-04-12 RX ORDER — TIRZEPATIDE 2.5 MG/.5ML
2.5 INJECTION, SOLUTION SUBCUTANEOUS
Qty: 2 ML | Refills: 3 | Status: SHIPPED | OUTPATIENT
Start: 2024-04-14

## 2024-04-18 ENCOUNTER — TELEPHONE (OUTPATIENT)
Dept: PRIMARY CARE | Facility: CLINIC | Age: 69
End: 2024-04-18
Payer: COMMERCIAL

## 2024-04-18 NOTE — TELEPHONE ENCOUNTER
SPOKE WITH [facility] MELVA CHAMORRO APPROVED 04/18/2024 - 0418/2025. CASE ID [id number]

## 2024-04-18 NOTE — PROGRESS NOTES
Patient presents to the office today for a 6 MO F/U w/PSA... Biopsy on (02/20/2023) show Prostate Right Bx Benign Prostatic  Tissue with Chronic Inflammation and Prostate Left Bx Benign Prostatic Tissue with Focal Chronic Inflammation. Patient was seen 03/06/23 for biopsy results. He had Increased urgency and frequency, some dysuria. No gross hematuria. Nocturia 2-3x( since bx). .Patient states that since last visit his Frequency and Urgency has gotten better... Most recent PSA was 4.80 (01/2024), prior was 4.74 (11/2022)No Fm Hx of Prostate CA. Prior to bx LUTs sx were chronic and mild. Denies frequency and urgency. Denies dysuria and hematuria.. Nocturia x3.. .Failed with Flomax ....No Hx of Kidney Stones No Hx of UTI's. No issue with ED. Patient has history of 2 knee replacements unable to obtain MRI. Patient improved with Rocephin given last visit for low grade fever.      Has had cardiac surgery earlier in 2023 and is continuing to followup with a cardiologist for that.

## 2024-04-18 NOTE — TELEPHONE ENCOUNTER
CALLED PATIENT, LEFT DETAILED MESSAGE ON VOICE MAIL THAT OL[facility] HAS BEEN APPROVED [facility]. INSTRUCTED PATIENT TO CALL OFFICE WITH QUESTIONS OR CONCERNS

## 2024-04-22 ENCOUNTER — APPOINTMENT (OUTPATIENT)
Dept: UROLOGY | Facility: CLINIC | Age: 69
End: 2024-04-22
Payer: COMMERCIAL

## 2024-04-29 ENCOUNTER — OFFICE VISIT (OUTPATIENT)
Dept: UROLOGY | Facility: CLINIC | Age: 69
End: 2024-04-29
Payer: COMMERCIAL

## 2024-04-29 DIAGNOSIS — R97.20 ELEVATED PSA: Primary | ICD-10-CM

## 2024-04-29 PROCEDURE — 1160F RVW MEDS BY RX/DR IN RCRD: CPT | Performed by: STUDENT IN AN ORGANIZED HEALTH CARE EDUCATION/TRAINING PROGRAM

## 2024-04-29 PROCEDURE — 1036F TOBACCO NON-USER: CPT | Performed by: STUDENT IN AN ORGANIZED HEALTH CARE EDUCATION/TRAINING PROGRAM

## 2024-04-29 PROCEDURE — 99214 OFFICE O/P EST MOD 30 MIN: CPT | Performed by: STUDENT IN AN ORGANIZED HEALTH CARE EDUCATION/TRAINING PROGRAM

## 2024-04-29 PROCEDURE — 3008F BODY MASS INDEX DOCD: CPT | Performed by: STUDENT IN AN ORGANIZED HEALTH CARE EDUCATION/TRAINING PROGRAM

## 2024-04-29 PROCEDURE — 1159F MED LIST DOCD IN RCRD: CPT | Performed by: STUDENT IN AN ORGANIZED HEALTH CARE EDUCATION/TRAINING PROGRAM

## 2024-04-29 RX ORDER — EZETIMIBE 10 MG/1
10 TABLET ORAL DAILY
COMMUNITY

## 2024-04-29 NOTE — PROGRESS NOTES
Subjective   Patient ID: Joseph Isaac is a 69 y.o. male    HPI  69 y.o. male who presents to the office today for a 6 MO F/U w/PSA... Biopsy on (02/20/2023) show Prostate Right Bx Benign Prostatic  Tissue with Chronic Inflammation and Prostate Left Bx Benign Prostatic Tissue with Focal Chronic Inflammation. Patient was seen 03/06/23 for biopsy results. He had Increased urgency and frequency, some dysuria. No gross hematuria. Nocturia 2-3x( since bx). .Patient states that since last visit his Frequency and Urgency has gotten better... Most recent PSA was 4.80 (01/2024), prior was 4.74 (11/2022)No Fm Hx of Prostate CA. Prior to bx LUTs sx were chronic and mild. Denies frequency and urgency. Denies dysuria and hematuria.. Nocturia x3.. .Failed with Flomax ....No Hx of Kidney Stones No Hx of UTI's. No issue with ED. Patient has history of 2 knee replacements unable to obtain MRI. Patient improved with Rocephin given last visit for low grade fever.      Has had cardiac surgery earlier in 2023 and is continuing to followup with a cardiologist for that.       Review of Systems    All systems were reviewed. Anything negative was noted in the HPI.    Objective   Physical Exam    General: Well developed, well nourished, alert and cooperative, appears in no acute distress   Eyes: Non-injected conjunctiva, sclera clear, no proptosis   Cardiac: Extremities are warm and well perfused. No edema, cyanosis or pallor   Lungs: Breathing is easy, non-labored. Speaking in clear and complete sentences. Normal diaphragmatic movement   MSK: Ambulatory with steady gait, unassisted   Neuro: Alert and oriented to person, place, and time   Psych: Demonstrates good judgment and reason, without hallucinations, abnormal affect or abnormal behaviors   Skin: No obvious lesions, no rashes       No CVA tenderness bilaterally   No suprapubic pain or discomfort       Past Medical History:   Diagnosis Date    Arthritis     Cancer (Multi) 2025     CHF (congestive heart failure) (Multi) March 2023    Diabetes mellitus (Multi)     GERD (gastroesophageal reflux disease)     Hypertension     Sleep apnea          Past Surgical History:   Procedure Laterality Date    CARDIAC SURGERY      COLON SURGERY      COLONOSCOPY      CORONARY ARTERY BYPASS GRAFT  04/2023    KNEE ARTHROSCOPY W/ DEBRIDEMENT Right     TOTAL KNEE ARTHROPLASTY Left 12/02/2022    TOTAL KNEE ARTHROPLASTY Right 07/15/2022           Assessment/Plan   Elevated PSA    69 y.o. male who presents for the above condition,  We had a very long and extensive discussion with the patient regarding elevated PSA. I explained to him the pathophysiology, differential diagnosis, risk factor, associated conditions, and management. I explained to him that elevated PSA could be either due to BPH, prostate infection or inflammation or prostate adenocarcinoma. We discussed the need to do a work-up to rule out any underlying prostate adenocarcinoma in the form of MR fusion biopsy if his MRI is positive or regular TRUS biopsy of the MRI is negative or we cannot do an MRI of the prostate. We discussed at length the risk, benefit, potential complication, adverse events of prostate biopsy including pain, discomfort, urinary retention, hematuria, pneumaturia, hematospermia. Explained to him that there is a 2% to 5% risk of complicated urinary infection and urosepsis. Explained to him indicates it happens, he will need to be admitted for IV antibiotic for 2 to 3 days at the hospital.     Pt wants to observe his PSA trend.     Plan:  - Follow up in 6 months with PSA        4/29/2024    Scribe Attestation  By signing my name below, ITerry Scribe   attest that this documentation has been prepared under the direction and in the presence of Dr. Antoine Juan

## 2024-05-03 DIAGNOSIS — R73.02 IGT (IMPAIRED GLUCOSE TOLERANCE): ICD-10-CM

## 2024-05-07 DIAGNOSIS — R73.02 IGT (IMPAIRED GLUCOSE TOLERANCE): ICD-10-CM

## 2024-05-07 DIAGNOSIS — E66.01 MORBID OBESITY WITH BMI OF 45.0-49.9, ADULT (MULTI): ICD-10-CM

## 2024-05-07 RX ORDER — METFORMIN HYDROCHLORIDE 500 MG/1
500 TABLET ORAL DAILY
Qty: 90 TABLET | Refills: 3 | Status: SHIPPED | OUTPATIENT
Start: 2024-05-07

## 2024-05-07 RX ORDER — METFORMIN HYDROCHLORIDE 500 MG/1
500 TABLET ORAL DAILY
Qty: 90 TABLET | Refills: 3 | Status: SHIPPED | OUTPATIENT
Start: 2024-05-07 | End: 2024-05-07 | Stop reason: SDUPTHER

## 2024-05-07 RX ORDER — TIRZEPATIDE 5 MG/.5ML
5 INJECTION, SOLUTION SUBCUTANEOUS
Qty: 2 ML | Refills: 11 | Status: SHIPPED | OUTPATIENT
Start: 2024-05-12

## 2024-05-07 NOTE — TELEPHONE ENCOUNTER
PATIENT HAS TAKEN MOUNJARO, 2.5 MG WEEKLY X 3. TOLERATING MEDICATION WITHOUT ADVERSE REACTION. BLOOD GLUCOSE AVERAGE 115-120. PATIENT RECALLED YOU MIGHT CONSIDER INCREASING DOSAGE TO 5 MG WEEKLY AFTER 1 MONTH. IF DOSAGE UNAVAILABLE, WILL IT BE ACCEPTABLE TO REMAIN ON 2.5 MG DOSAGE?

## 2024-05-07 NOTE — TELEPHONE ENCOUNTER
PATIENT HAS TAKEN MOUNJARO, 2.5 MG WEEKLY X 3. TOLERATING MEDICATION WITHOUT ADVERSE REACTION. BLOOD GLUCOSE AVERAGE 115-120. PATIENT RECALLED YOU MIGHT CONSIDER INCREASING DOSAGE TO 5 MG WEEKLY AFTER 1 MONTH. IF DOSAGE UNAVAILABLE, WILL IT BE ACCEPTABLE TO REMAIN ON 2.5 MG DOSAGE? SEND TO THE [facility] SELECTED

## 2024-05-08 ENCOUNTER — TELEPHONE (OUTPATIENT)
Dept: PRIMARY CARE | Facility: CLINIC | Age: 69
End: 2024-05-08
Payer: COMMERCIAL

## 2024-05-08 NOTE — TELEPHONE ENCOUNTER
PATIENT HAS TAKEN MOUNJARO, 2.5 MG WEEKLY X 3. TOLERATING MEDICATION WITHOUT ADVERSE REACTION. BLOOD GLUCOSE AVERAGE 115-120. PATIENT RECALLED YOU MIGHT CONSIDER INCREASING DOSAGE TO 5 MG WEEKLY AFTER 1 MONTH. IF DOSAGE UNAVAILABLE, WILL IT BE ACCEPTABLE TO REMAIN ON 2.5 MG DOSAGE? SEND TO THE DRUG [facility] SELECTED     Yes, lets increase to 5 mg Mounjaro.  Prescription was sent to the preferred listed pharmacy     PATIENT NOTIFIED. CONFIRMED PRESCRIPTION WAS S[facility] [address] IN [place]. PATIENT VERBALIZED UNDERSTANDING.

## 2024-05-10 DIAGNOSIS — R97.20 ELEVATED PSA: ICD-10-CM

## 2024-05-14 RX ORDER — TAMSULOSIN HYDROCHLORIDE 0.4 MG/1
0.4 CAPSULE ORAL DAILY
Qty: 90 CAPSULE | Refills: 3 | Status: SHIPPED | OUTPATIENT
Start: 2024-05-14

## 2024-05-14 NOTE — TELEPHONE ENCOUNTER
Pt called asking for a refill on 10mg of prednisone. 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 Please advise.

## 2024-05-23 DIAGNOSIS — M19.90 INFLAMMATORY ARTHROPATHY: ICD-10-CM

## 2024-06-03 RX ORDER — PREDNISONE 10 MG/1
60 TABLET ORAL DAILY
Qty: 30 TABLET | Refills: 0 | Status: SHIPPED | OUTPATIENT
Start: 2024-06-03 | End: 2024-06-08

## 2024-06-03 NOTE — TELEPHONE ENCOUNTER
Pt called asking for a refill on 10mg of prednisone. 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  Rhematologist gave him celebrex but doesn't give relief and only other option was surgery which he does not want. Please advise.

## 2024-06-07 DIAGNOSIS — I10 ESSENTIAL HYPERTENSION: ICD-10-CM

## 2024-06-13 RX ORDER — METOPROLOL TARTRATE 25 MG/1
25 TABLET, FILM COATED ORAL 2 TIMES DAILY
Qty: 180 TABLET | Refills: 3 | Status: SHIPPED | OUTPATIENT
Start: 2024-06-13

## 2024-06-13 NOTE — TELEPHONE ENCOUNTER
PATIENT WILL BE ON THE ON THE MOUNJARO 5 MG FOR ANOTHER 2 WEEKS. HE FOUND A SUPPLY OF THE 7.5 MG AT THE [facility] IN [place], [place]. REQUESTS FOR YOU TO SEND THE PRESCRIPTION IN NOW SO THERE WILL BE NO INTERRUPTION OF THERAPY

## 2024-06-14 ENCOUNTER — TELEPHONE (OUTPATIENT)
Dept: PRIMARY CARE | Facility: CLINIC | Age: 69
End: 2024-06-14
Payer: COMMERCIAL

## 2024-06-14 DIAGNOSIS — R73.02 IGT (IMPAIRED GLUCOSE TOLERANCE): ICD-10-CM

## 2024-06-14 DIAGNOSIS — I25.810 CORONARY ARTERY DISEASE INVOLVING AUTOLOGOUS ARTERY CORONARY BYPASS GRAFT WITHOUT ANGINA PECTORIS: ICD-10-CM

## 2024-06-14 DIAGNOSIS — R73.02 IGT (IMPAIRED GLUCOSE TOLERANCE): Primary | ICD-10-CM

## 2024-06-14 DIAGNOSIS — E66.01 MORBID OBESITY WITH BMI OF 45.0-49.9, ADULT (MULTI): ICD-10-CM

## 2024-06-14 DIAGNOSIS — I10 ESSENTIAL HYPERTENSION: ICD-10-CM

## 2024-06-14 RX ORDER — TIRZEPATIDE 7.5 MG/.5ML
7.5 INJECTION, SOLUTION SUBCUTANEOUS
Qty: 2 ML | Refills: 11 | Status: SHIPPED | OUTPATIENT
Start: 2024-06-16

## 2024-06-14 RX ORDER — LANCETS
1 EACH MISCELLANEOUS 3 TIMES WEEKLY
COMMUNITY

## 2024-06-14 RX ORDER — ISOPROPYL ALCOHOL 70 ML/100ML
1 SWAB TOPICAL 3 TIMES WEEKLY
COMMUNITY
End: 2024-06-14 | Stop reason: SDUPTHER

## 2024-06-14 NOTE — TELEPHONE ENCOUNTER
PATIENT CALLED, HAS 2 WKS LEFT OF MOUNJARO 5 MG.   FOUND MOUNJARO 7.5 MG AT [facility] [place].   REQUESTS YOU SEND  PRESCRIPTIONS IN NOW FOR THIS.    PLEASE

## 2024-06-17 RX ORDER — ISOPROPYL ALCOHOL 70 ML/100ML
1 SWAB TOPICAL 3 TIMES WEEKLY
Qty: 100 EACH | Refills: 3 | Status: SHIPPED | OUTPATIENT
Start: 2024-06-17

## 2024-06-17 RX ORDER — EZETIMIBE 10 MG/1
10 TABLET ORAL DAILY
Qty: 90 TABLET | Refills: 3 | Status: SHIPPED | OUTPATIENT
Start: 2024-06-17

## 2024-06-17 RX ORDER — POTASSIUM CHLORIDE 20 MEQ/1
20 TABLET, EXTENDED RELEASE ORAL DAILY
Qty: 90 TABLET | Refills: 3 | Status: SHIPPED | OUTPATIENT
Start: 2024-06-17

## 2024-06-17 RX ORDER — POTASSIUM CHLORIDE 20 MEQ/1
20 TABLET, EXTENDED RELEASE ORAL DAILY
COMMUNITY
End: 2024-06-17 | Stop reason: SDUPTHER

## 2024-06-17 RX ORDER — RAMIPRIL 10 MG/1
10 CAPSULE ORAL
Qty: 100 CAPSULE | Refills: 3 | Status: SHIPPED | OUTPATIENT
Start: 2024-06-17 | End: 2025-06-17

## 2024-06-18 ENCOUNTER — TELEPHONE (OUTPATIENT)
Dept: PRIMARY CARE | Facility: CLINIC | Age: 69
End: 2024-06-18
Payer: COMMERCIAL

## 2024-06-18 NOTE — TELEPHONE ENCOUNTER
PATIENT INCREASED MOUNJARO  TO 5 MG.   HAVING MILD NAUSEA.   WANTS TO STAY ON THIS DOSE.   WHAT CAN HE USE FOR NAUSEA ?

## 2024-06-21 ENCOUNTER — TELEPHONE (OUTPATIENT)
Dept: PRIMARY CARE | Facility: CLINIC | Age: 69
End: 2024-06-21
Payer: COMMERCIAL

## 2024-06-21 NOTE — TELEPHONE ENCOUNTER
PATIENT INCREASED MOUNJARO THIS WEEK.   HAD NAUSEA  2 1/2  DAYS.   IS GOING ON VACATION NEXT WEEK.   DOES NOT WANT TO BE SICK TO STOMACH.     CAN HE SKIP NEXT WEEK ?    OR DO YOU THINK 2ND DOSE WILL GO BETTER ?

## 2024-07-06 ENCOUNTER — LAB (OUTPATIENT)
Dept: LAB | Facility: LAB | Age: 69
End: 2024-07-06
Payer: COMMERCIAL

## 2024-07-06 DIAGNOSIS — R97.20 ELEVATED PSA: ICD-10-CM

## 2024-07-06 DIAGNOSIS — E11.9 TYPE 2 DIABETES MELLITUS WITHOUT COMPLICATION, WITHOUT LONG-TERM CURRENT USE OF INSULIN (MULTI): ICD-10-CM

## 2024-07-06 LAB
ALBUMIN SERPL BCP-MCNC: 4.1 G/DL (ref 3.4–5)
ALP SERPL-CCNC: 69 U/L (ref 33–136)
ALT SERPL W P-5'-P-CCNC: 15 U/L (ref 10–52)
ANION GAP SERPL CALC-SCNC: 13 MMOL/L (ref 10–20)
AST SERPL W P-5'-P-CCNC: 16 U/L (ref 9–39)
BILIRUB SERPL-MCNC: 0.6 MG/DL (ref 0–1.2)
BUN SERPL-MCNC: 18 MG/DL (ref 6–23)
CALCIUM SERPL-MCNC: 9.3 MG/DL (ref 8.6–10.3)
CHLORIDE SERPL-SCNC: 101 MMOL/L (ref 98–107)
CHOLEST SERPL-MCNC: 140 MG/DL (ref 0–199)
CHOLESTEROL/HDL RATIO: 3.6
CO2 SERPL-SCNC: 27 MMOL/L (ref 21–32)
CREAT SERPL-MCNC: 0.94 MG/DL (ref 0.5–1.3)
EGFRCR SERPLBLD CKD-EPI 2021: 88 ML/MIN/1.73M*2
ERYTHROCYTE [DISTWIDTH] IN BLOOD BY AUTOMATED COUNT: 19.2 % (ref 11.5–14.5)
EST. AVERAGE GLUCOSE BLD GHB EST-MCNC: 126 MG/DL
GLUCOSE SERPL-MCNC: 100 MG/DL (ref 74–99)
HBA1C MFR BLD: 6 %
HCT VFR BLD AUTO: 44.4 % (ref 41–52)
HDLC SERPL-MCNC: 39 MG/DL
HGB BLD-MCNC: 13.7 G/DL (ref 13.5–17.5)
LDLC SERPL CALC-MCNC: 75 MG/DL
MCH RBC QN AUTO: 22.9 PG (ref 26–34)
MCHC RBC AUTO-ENTMCNC: 30.9 G/DL (ref 32–36)
MCV RBC AUTO: 74 FL (ref 80–100)
NON HDL CHOLESTEROL: 101 MG/DL (ref 0–149)
NRBC BLD-RTO: 0 /100 WBCS (ref 0–0)
PLATELET # BLD AUTO: 244 X10*3/UL (ref 150–450)
POTASSIUM SERPL-SCNC: 4 MMOL/L (ref 3.5–5.3)
PROT SERPL-MCNC: 6.3 G/DL (ref 6.4–8.2)
PSA SERPL-MCNC: 6.07 NG/ML
RBC # BLD AUTO: 5.98 X10*6/UL (ref 4.5–5.9)
SODIUM SERPL-SCNC: 137 MMOL/L (ref 136–145)
TRIGL SERPL-MCNC: 132 MG/DL (ref 0–149)
VLDL: 26 MG/DL (ref 0–40)
WBC # BLD AUTO: 5.3 X10*3/UL (ref 4.4–11.3)

## 2024-07-06 PROCEDURE — 80061 LIPID PANEL: CPT

## 2024-07-06 PROCEDURE — 80053 COMPREHEN METABOLIC PANEL: CPT

## 2024-07-06 PROCEDURE — 84153 ASSAY OF PSA TOTAL: CPT

## 2024-07-06 PROCEDURE — 85027 COMPLETE CBC AUTOMATED: CPT

## 2024-07-06 PROCEDURE — 83036 HEMOGLOBIN GLYCOSYLATED A1C: CPT

## 2024-07-06 PROCEDURE — 36415 COLL VENOUS BLD VENIPUNCTURE: CPT

## 2024-07-11 ENCOUNTER — APPOINTMENT (OUTPATIENT)
Dept: PRIMARY CARE | Facility: CLINIC | Age: 69
End: 2024-07-11
Payer: COMMERCIAL

## 2024-07-11 ENCOUNTER — OFFICE VISIT (OUTPATIENT)
Dept: PRIMARY CARE | Facility: CLINIC | Age: 69
End: 2024-07-11
Payer: COMMERCIAL

## 2024-07-11 VITALS
BODY MASS INDEX: 49.27 KG/M2 | DIASTOLIC BLOOD PRESSURE: 66 MMHG | WEIGHT: 313.9 LBS | SYSTOLIC BLOOD PRESSURE: 120 MMHG | HEART RATE: 53 BPM | HEIGHT: 67 IN | OXYGEN SATURATION: 97 %

## 2024-07-11 DIAGNOSIS — I10 ESSENTIAL HYPERTENSION: ICD-10-CM

## 2024-07-11 DIAGNOSIS — I25.810 CORONARY ARTERY DISEASE INVOLVING AUTOLOGOUS ARTERY CORONARY BYPASS GRAFT WITHOUT ANGINA PECTORIS: ICD-10-CM

## 2024-07-11 DIAGNOSIS — K21.00 GASTROESOPHAGEAL REFLUX DISEASE WITH ESOPHAGITIS WITHOUT HEMORRHAGE: ICD-10-CM

## 2024-07-11 DIAGNOSIS — D58.2 HEMOGLOBINOPATHY (CMS-HCC): ICD-10-CM

## 2024-07-11 DIAGNOSIS — R73.02 IGT (IMPAIRED GLUCOSE TOLERANCE): ICD-10-CM

## 2024-07-11 DIAGNOSIS — E11.9 TYPE 2 DIABETES MELLITUS WITHOUT COMPLICATION, WITHOUT LONG-TERM CURRENT USE OF INSULIN (MULTI): ICD-10-CM

## 2024-07-11 PROCEDURE — 1160F RVW MEDS BY RX/DR IN RCRD: CPT | Performed by: FAMILY MEDICINE

## 2024-07-11 PROCEDURE — 3048F LDL-C <100 MG/DL: CPT | Performed by: FAMILY MEDICINE

## 2024-07-11 PROCEDURE — 99214 OFFICE O/P EST MOD 30 MIN: CPT | Performed by: FAMILY MEDICINE

## 2024-07-11 PROCEDURE — 3044F HG A1C LEVEL LT 7.0%: CPT | Performed by: FAMILY MEDICINE

## 2024-07-11 PROCEDURE — 3078F DIAST BP <80 MM HG: CPT | Performed by: FAMILY MEDICINE

## 2024-07-11 PROCEDURE — 3008F BODY MASS INDEX DOCD: CPT | Performed by: FAMILY MEDICINE

## 2024-07-11 PROCEDURE — 3074F SYST BP LT 130 MM HG: CPT | Performed by: FAMILY MEDICINE

## 2024-07-11 PROCEDURE — 1036F TOBACCO NON-USER: CPT | Performed by: FAMILY MEDICINE

## 2024-07-11 PROCEDURE — 4010F ACE/ARB THERAPY RXD/TAKEN: CPT | Performed by: FAMILY MEDICINE

## 2024-07-11 PROCEDURE — 1159F MED LIST DOCD IN RCRD: CPT | Performed by: FAMILY MEDICINE

## 2024-07-11 RX ORDER — PANTOPRAZOLE SODIUM 40 MG/1
40 TABLET, DELAYED RELEASE ORAL DAILY
Qty: 90 TABLET | Refills: 3 | Status: SHIPPED | OUTPATIENT
Start: 2024-07-11 | End: 2025-07-11

## 2024-07-11 RX ORDER — CHLORTHALIDONE 25 MG/1
25 TABLET ORAL DAILY
Qty: 90 TABLET | Refills: 3 | Status: SHIPPED | OUTPATIENT
Start: 2024-07-11 | End: 2025-07-11

## 2024-07-11 RX ORDER — LANCETS
EACH MISCELLANEOUS
Qty: 100 EACH | Refills: 0 | Status: SHIPPED | OUTPATIENT
Start: 2024-07-12

## 2024-07-11 RX ORDER — METOPROLOL TARTRATE 25 MG/1
25 TABLET, FILM COATED ORAL 2 TIMES DAILY
Qty: 180 TABLET | Refills: 3 | Status: SHIPPED | OUTPATIENT
Start: 2024-07-11

## 2024-07-11 NOTE — PROGRESS NOTES
"Subjective   Patient ID: Joseph Isaac is a 69 y.o. male who presents for Follow-up (6 mo rev labs).    HPI Since the last office visit there have been no interval operations, hospitalizations, important illnesses or injuries.  Has elevated PSA managed by Dr. Chow  Diabetes checks weekly was euglycemic about 120.  Has stairstep up on Mounjaro and will begin 7.5 next week.  His weight on April 29 was 328 and is now 313.  There was perhaps 1 time the day following injection that he had some GI symptoms    Labs show no anemia but hemogram has become hypomicro.  He had a recent colonoscopy with no source of blood loss.  We are going to washout the Celebrex and reapprove indication if needed  Notes pain in the left sciatic notch area/SI joint area this pain is there upon rising in the a.m. and typically resolves after 10 to 15 minutes.  At the end of the day if he has been active he will notice some aching.  I would tend to favor this to be orthopedic rather than neurologic and with no more disability would not feel other intervention necessary.  Option for pain management injection physical therapy and radiographs reviewed  Possibility of assisted this year  Discussed options of improved range of motion knees if orthopedist feels EUAappropriate  GERD-Takes PPI daily with no breakthrough symptoms.  Reviewed dietary, caffeine, tobacco, alcohol, and NSAID avoidance. No dyspepsia, dysphagia, reflux, melena, or abdominal pain.  CAD-no angina  Review of Systems  General-no fatigue weight to within 10 pounds  ENT no problems with vision swallowing  Cardiac no chest pains palpitations change in exercise tolerance or capacity  Pulmonary no cough shortness of breath  GI no heartburn or abdominal pain  Musculoskeletal no joint pains  Objective   /80   Pulse 53   Ht 1.702 m (5' 7\")   Wt 142 kg (313 lb 14.4 oz)   SpO2 97%   BMI 49.16 kg/m²     Physical Exam  No bruit thyroid nontender heart regular without murmur " lungs clear to auscultation abdomen soft extremities knees bend about 90 degrees.  Assessment/Plan   Problem List Items Addressed This Visit             ICD-10-CM    Gastroesophageal reflux disease K21.9    Relevant Medications    pantoprazole (ProtoNix) 40 mg EC tablet    Other Relevant Orders    Follow Up In Primary Care - Established    Essential hypertension I10    Relevant Medications    chlorthalidone (Hygroton) 25 mg tablet    metoprolol tartrate (Lopressor) 25 mg tablet    Other Relevant Orders    Follow Up In Primary Care - Established    IGT (impaired glucose tolerance) R73.02    Relevant Orders    Follow Up In Primary Care - Established    Coronary artery disease involving autologous artery coronary bypass graft without angina pectoris I25.810    Relevant Medications    metoprolol tartrate (Lopressor) 25 mg tablet    Other Relevant Orders    Follow Up In Primary Care - Established     Other Visit Diagnoses         Codes    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

## 2024-07-18 ENCOUNTER — TELEPHONE (OUTPATIENT)
Dept: PRIMARY CARE | Facility: CLINIC | Age: 69
End: 2024-07-18
Payer: COMMERCIAL

## 2024-07-18 DIAGNOSIS — M54.50 ACUTE BILATERAL LOW BACK PAIN, UNSPECIFIED WHETHER SCIATICA PRESENT: Primary | ICD-10-CM

## 2024-07-18 RX ORDER — PREDNISONE 10 MG/1
TABLET ORAL
Qty: 30 TABLET | Refills: 0 | Status: SHIPPED | OUTPATIENT
Start: 2024-07-18 | End: 2024-07-29

## 2024-07-18 NOTE — TELEPHONE ENCOUNTER
PATIENT IN LAST WEEK, LOW BACK PAIN.    WOULD LIKE TO TRY A COURSE OF PREDNISONE.   PLEASE     [facility] [address] [place] [address]

## 2024-07-22 ENCOUNTER — APPOINTMENT (OUTPATIENT)
Dept: PRIMARY CARE | Facility: CLINIC | Age: 69
End: 2024-07-22
Payer: COMMERCIAL

## 2024-08-08 ENCOUNTER — TELEPHONE (OUTPATIENT)
Age: 69
End: 2024-08-08
Payer: COMMERCIAL

## 2024-08-08 DIAGNOSIS — E66.01 MORBID OBESITY WITH BMI OF 45.0-49.9, ADULT (MULTI): ICD-10-CM

## 2024-08-08 DIAGNOSIS — R73.02 IGT (IMPAIRED GLUCOSE TOLERANCE): ICD-10-CM

## 2024-08-08 RX ORDER — TIRZEPATIDE 10 MG/.5ML
10 INJECTION, SOLUTION SUBCUTANEOUS
Qty: 4 EACH | Refills: 11 | Status: SHIPPED | OUTPATIENT
Start: 2024-08-11 | End: 2024-08-13 | Stop reason: ALTCHOICE

## 2024-08-08 NOTE — TELEPHONE ENCOUNTER
Pt would like to increase to the next level for mounjaro. Pharmacy is [facility] in [place]. Please advise.

## 2024-08-13 RX ORDER — TIRZEPATIDE 12.5 MG/.5ML
12.5 INJECTION, SOLUTION SUBCUTANEOUS
Qty: 2 ML | Refills: 11 | Status: SHIPPED | OUTPATIENT
Start: 2024-08-18

## 2024-09-04 DIAGNOSIS — R73.02 IGT (IMPAIRED GLUCOSE TOLERANCE): ICD-10-CM

## 2024-09-04 DIAGNOSIS — E66.01 MORBID OBESITY WITH BMI OF 45.0-49.9, ADULT (MULTI): ICD-10-CM

## 2024-09-04 RX ORDER — TIRZEPATIDE 12.5 MG/.5ML
12.5 INJECTION, SOLUTION SUBCUTANEOUS
Qty: 2 ML | Refills: 11 | Status: SHIPPED | OUTPATIENT
Start: 2024-09-08

## 2024-10-14 ENCOUNTER — APPOINTMENT (OUTPATIENT)
Age: 69
End: 2024-10-14
Payer: COMMERCIAL

## 2024-10-17 ENCOUNTER — APPOINTMENT (OUTPATIENT)
Age: 69
End: 2024-10-17
Payer: COMMERCIAL

## 2024-10-17 VITALS
OXYGEN SATURATION: 99 % | SYSTOLIC BLOOD PRESSURE: 142 MMHG | DIASTOLIC BLOOD PRESSURE: 80 MMHG | HEIGHT: 67 IN | BODY MASS INDEX: 48.15 KG/M2 | WEIGHT: 306.8 LBS | HEART RATE: 57 BPM

## 2024-10-17 DIAGNOSIS — I25.810 CORONARY ARTERY DISEASE INVOLVING AUTOLOGOUS ARTERY CORONARY BYPASS GRAFT WITHOUT ANGINA PECTORIS: ICD-10-CM

## 2024-10-17 DIAGNOSIS — I10 ESSENTIAL HYPERTENSION: ICD-10-CM

## 2024-10-17 DIAGNOSIS — D58.2 HEMOGLOBINOPATHY (CMS-HCC): ICD-10-CM

## 2024-10-17 DIAGNOSIS — E11.9 TYPE 2 DIABETES MELLITUS WITHOUT COMPLICATION, WITHOUT LONG-TERM CURRENT USE OF INSULIN (MULTI): ICD-10-CM

## 2024-10-17 DIAGNOSIS — R73.02 IGT (IMPAIRED GLUCOSE TOLERANCE): ICD-10-CM

## 2024-10-17 DIAGNOSIS — K21.00 GASTROESOPHAGEAL REFLUX DISEASE WITH ESOPHAGITIS WITHOUT HEMORRHAGE: ICD-10-CM

## 2024-10-17 DIAGNOSIS — E66.01 SEVERE OBESITY (BMI >= 40) (MULTI): Primary | ICD-10-CM

## 2024-10-17 PROCEDURE — 1159F MED LIST DOCD IN RCRD: CPT | Performed by: FAMILY MEDICINE

## 2024-10-17 PROCEDURE — 1160F RVW MEDS BY RX/DR IN RCRD: CPT | Performed by: FAMILY MEDICINE

## 2024-10-17 PROCEDURE — 99214 OFFICE O/P EST MOD 30 MIN: CPT | Performed by: FAMILY MEDICINE

## 2024-10-17 PROCEDURE — 1036F TOBACCO NON-USER: CPT | Performed by: FAMILY MEDICINE

## 2024-10-17 PROCEDURE — 3048F LDL-C <100 MG/DL: CPT | Performed by: FAMILY MEDICINE

## 2024-10-17 PROCEDURE — 4010F ACE/ARB THERAPY RXD/TAKEN: CPT | Performed by: FAMILY MEDICINE

## 2024-10-17 PROCEDURE — 3077F SYST BP >= 140 MM HG: CPT | Performed by: FAMILY MEDICINE

## 2024-10-17 PROCEDURE — 3079F DIAST BP 80-89 MM HG: CPT | Performed by: FAMILY MEDICINE

## 2024-10-17 PROCEDURE — 3008F BODY MASS INDEX DOCD: CPT | Performed by: FAMILY MEDICINE

## 2024-10-17 PROCEDURE — 3044F HG A1C LEVEL LT 7.0%: CPT | Performed by: FAMILY MEDICINE

## 2024-10-17 NOTE — PROGRESS NOTES
"Subjective   Patient ID: Joseph Isaac is a 69 y.o. male who presents for Follow-up (3 mo).    HPI   Since the last office visit there have been no interval operations, hospitalizations, important illnesses or injuries.  Sees harshad next month- considering bx  Home  readings avg 120s/ 80s  Metoprolol at 25 daikly.  No angina.  No nitroglycerin use  Wt was 323#peak now now at 306, titrated since April.  Some nausea 24-48 hrs post injection  Diabetes remains euglycemic most recent A1c 6.0  HTN-Takes and tolerates meds without side effects. No alcohol. no tobacco. no exercise. low salt.  Reviewed recommendation for 150 minutes of exercise per week including 2 days of weight training if over age 50  GERD-Takes PPI daily with no breakthrough symptoms.  Reviewed dietary, caffeine, tobacco, alcohol, and NSAID avoidance.  Review of Systems  General-no fatigue weight to within 10 pounds  ENT no problems with vision swallowing  Cardiac no chest pains palpitations change in exercise tolerance or capacity  Pulmonary no cough shortness of breath  GI no heartburn or abdominal pain  Musculoskeletal no joint pains  Objective   /80   Pulse 57   Ht 1.702 m (5' 7\")   Wt 139 kg (306 lb 12.8 oz)   SpO2 99%   BMI 48.05 kg/m²     Physical Exam  General:  Alert, No acute distress. Appears stated age  Eye:  Pupils are equal, round and reactive to light, Extraocular movements are intact, Normal conjunctiva.    Neck:  Supple, Non-tender, No carotid bruit, No jugular venous distention, No lymphadenopathy, No thyromegaly.    Respiratory:  Lungs are clear to auscultation, Respirations are non-labored, Breath sounds are equal.    Cardiovascular:  Normal rate, Regular rhythm, No murmur.    Gastrointestinal:  Soft, Non-tender, No organomegaly. No solid or pulsatile mass  Integumentary:  Warm, Dry. No concerning lesions on exposed areas  Neurologic:  Alert, Oriented.  Gross and fine motor intact, CN 2-12 intact  Psychiatric:  " Cooperative, Appropriate mood & affect.  Assessment/Plan   Problem List Items Addressed This Visit             ICD-10-CM    Gastroesophageal reflux disease K21.9    Relevant Orders    Follow Up In Primary Care    Essential hypertension I10    Relevant Orders    Follow Up In Primary Care    Comprehensive Metabolic Panel    CBC    Lipid Panel    IGT (impaired glucose tolerance) R73.02    Severe obesity (BMI >= 40) (Multi) - Primary E66.01    Coronary artery disease involving autologous artery coronary bypass graft without angina pectoris I25.810    Relevant Orders    Follow Up In Primary Care    Comprehensive Metabolic Panel    CBC    Lipid Panel     Other Visit Diagnoses         Codes    Type 2 diabetes mellitus without complication, without long-term current use of insulin (Multi)     E11.9    Relevant Orders    Follow Up In Primary Care    Comprehensive Metabolic Panel    CBC    Albumin-Creatinine Ratio, Urine Random    Hemoglobin A1C    Lipid Panel    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

## 2024-10-28 ENCOUNTER — APPOINTMENT (OUTPATIENT)
Dept: UROLOGY | Facility: CLINIC | Age: 69
End: 2024-10-28
Payer: COMMERCIAL

## 2024-10-28 VITALS — WEIGHT: 309 LBS | BODY MASS INDEX: 48.5 KG/M2 | HEIGHT: 67 IN

## 2024-10-28 DIAGNOSIS — R97.20 ELEVATED PSA: Primary | ICD-10-CM

## 2024-10-28 PROCEDURE — 3008F BODY MASS INDEX DOCD: CPT | Performed by: STUDENT IN AN ORGANIZED HEALTH CARE EDUCATION/TRAINING PROGRAM

## 2024-10-28 PROCEDURE — 1159F MED LIST DOCD IN RCRD: CPT | Performed by: STUDENT IN AN ORGANIZED HEALTH CARE EDUCATION/TRAINING PROGRAM

## 2024-10-28 PROCEDURE — 99214 OFFICE O/P EST MOD 30 MIN: CPT | Performed by: STUDENT IN AN ORGANIZED HEALTH CARE EDUCATION/TRAINING PROGRAM

## 2024-10-28 PROCEDURE — G2211 COMPLEX E/M VISIT ADD ON: HCPCS | Performed by: STUDENT IN AN ORGANIZED HEALTH CARE EDUCATION/TRAINING PROGRAM

## 2024-10-28 PROCEDURE — 1036F TOBACCO NON-USER: CPT | Performed by: STUDENT IN AN ORGANIZED HEALTH CARE EDUCATION/TRAINING PROGRAM

## 2024-11-04 ENCOUNTER — TELEPHONE (OUTPATIENT)
Dept: UROLOGY | Facility: HOSPITAL | Age: 69
End: 2024-11-04
Payer: COMMERCIAL

## 2024-11-04 DIAGNOSIS — F41.9 ANXIETY: Primary | ICD-10-CM

## 2024-11-04 RX ORDER — DIAZEPAM 5 MG/1
5 TABLET ORAL
Qty: 1 TABLET | Refills: 0 | Status: SHIPPED | OUTPATIENT
Start: 2024-11-04 | End: 2024-11-04

## 2024-11-04 NOTE — TELEPHONE ENCOUNTER
LM on cell to have imaging done where is convenient for patient, Medication pended to MD for procedure.    Jami Blancas LPN

## 2024-11-08 ENCOUNTER — HOSPITAL ENCOUNTER (OUTPATIENT)
Dept: RADIOLOGY | Facility: HOSPITAL | Age: 69
Discharge: HOME | End: 2024-11-08
Payer: COMMERCIAL

## 2024-11-08 DIAGNOSIS — R97.20 ELEVATED PSA: ICD-10-CM

## 2024-11-08 PROCEDURE — A9575 INJ GADOTERATE MEGLUMI 0.1ML: HCPCS | Performed by: STUDENT IN AN ORGANIZED HEALTH CARE EDUCATION/TRAINING PROGRAM

## 2024-11-08 PROCEDURE — 72197 MRI PELVIS W/O & W/DYE: CPT

## 2024-11-08 PROCEDURE — 2550000001 HC RX 255 CONTRASTS: Performed by: STUDENT IN AN ORGANIZED HEALTH CARE EDUCATION/TRAINING PROGRAM

## 2024-11-08 RX ORDER — GADOTERATE MEGLUMINE 376.9 MG/ML
28 INJECTION INTRAVENOUS
Status: COMPLETED | OUTPATIENT
Start: 2024-11-08 | End: 2024-11-08

## 2024-12-02 DIAGNOSIS — M19.90 INFLAMMATORY ARTHROPATHY: Primary | ICD-10-CM

## 2024-12-02 RX ORDER — PREDNISONE 10 MG/1
10 TABLET ORAL DAILY
Qty: 40 TABLET | Refills: 3 | Status: SHIPPED | OUTPATIENT
Start: 2024-12-02 | End: 2025-05-11

## 2024-12-08 NOTE — PROGRESS NOTES
Subjective   Patient ID: Joseph Isaac is a 69 y.o. male    HPI  Today's visit was done virtually after appropriate consent from the patient.    69 y.o. male who presents for follow-up visit with MRI results... Biopsy on (02/20/2023) show Prostate Right Bx Benign Prostatic Tissue with Chronic Inflammation and Prostate Left Bx Benign Prostatic Tissue with Focal Chronic Inflammation. Patient was seen 03/06/23 for biopsy results. He had Increased urgency and frequency, some dysuria. No gross hematuria. Nocturia 2-3x( since bx). .Patient states that since last visit his Frequency and Urgency has gotten better... Most recent PSA was 6.07 (07/2024), prior was 4.80 (01/2024), prior was 4.74 (11/2022)No Fm Hx of Prostate CA. Prior to bx LUTs sx were chronic and mild. Denies frequency and urgency. Denies dysuria and hematuria.. Nocturia x3.. .Failed with Flomax ....No Hx of Kidney Stones No Hx of UTI's. No issue with ED. Patient has history of 2 knee replacements unable to obtain MRI. Patient improved with Rocephin given last visit for low grade fever.     The most recent MR prostate with les boundaries if pirads 3 or above, conducted on 11/08/2024, revealed:  1. A 1.8 cm PI-RADS 3 lesion within the left anterior transitional  zone at the midgland. No extraprostatic extension. No suspicious  lymphadenopathy.  2. BPH changes of the transition zone. Diffuse non nodular  hypointensities within the peripheral zone, without evidence of  focally restricted diffusion ( PI-RADS 2).      PI-RADS 3 - Intermediate (the presence of clinically significant  cancer is equivocal).      Review of Systems    All systems were reviewed. Anything negative was noted in the HPI.    Objective   Physical Exam    General: Well developed, well nourished, alert and cooperative, appears in no acute distress   Eyes: Non-injected conjunctiva, sclera clear, no proptosis   Cardiac: Extremities are warm and well perfused. No edema, cyanosis or pallor    Lungs: Breathing is easy, non-labored. Speaking in clear and complete sentences. Normal diaphragmatic movement   MSK: Ambulatory with steady gait, unassisted   Neuro: Alert and oriented to person, place, and time   Psych: Demonstrates good judgment and reason, without hallucinations, abnormal affect or abnormal behaviors   Skin: No obvious lesions, no rashes       No CVA tenderness bilaterally   No suprapubic pain or discomfort       Past Medical History:   Diagnosis Date    Arthritis     Cancer (Multi) 2025    CHF (congestive heart failure) March 2023    Diabetes mellitus (Multi)     GERD (gastroesophageal reflux disease)     Hypertension     Sleep apnea          Past Surgical History:   Procedure Laterality Date    CARDIAC SURGERY      COLON SURGERY      COLONOSCOPY      CORONARY ARTERY BYPASS GRAFT  04/2023    KNEE ARTHROSCOPY W/ DEBRIDEMENT Right     TOTAL KNEE ARTHROPLASTY Left 12/02/2022    TOTAL KNEE ARTHROPLASTY Right 07/15/2022       Assessment/Plan   Elevated PSA, PI-RADS 3 lesion on MRI 11/08/2024      69 y.o. male who presents for the above condition,  We had a very long and extensive discussion with the patient regarding elevated PSA. I explained to him the pathophysiology, differential diagnosis, risk factor, associated conditions, and management. I explained to him that elevated PSA could be either due to BPH, prostate infection or inflammation or prostate adenocarcinoma. We discussed the need to do a work-up to rule out any underlying prostate adenocarcinoma in the form of MR fusion biopsy if his MRI is positive or regular TRUS biopsy of the MRI is negative or we cannot do an MRI of the prostate. We discussed at length the risk, benefit, potential complication, adverse events of prostate biopsy including pain, discomfort, urinary retention, hematuria, pneumaturia, hematospermia. Explained to him that there is a 2% to 5% risk of complicated urinary infection and urosepsis. Explained to him indicates  it happens, he will need to be admitted for IV antibiotic for 2 to 3 days at the hospital.      -Pt would like to consider his options between doing biopsy or monitoring his PSA and give us a call if he decides to move forward with the biopsy.    Plan:  - Follow-up in 6 month with PSA    E&M visit today is associated with current or anticipated ongoing medical care services related to a patient's single, serious condition or a complex condition.     12/9/2024    Scribe Attestation  By signing my name below, I, Elisabeth Stanton attest that this documentation has been prepared under the direction and in the presence of Dr. Antoine Juan.

## 2024-12-09 ENCOUNTER — APPOINTMENT (OUTPATIENT)
Dept: UROLOGY | Facility: CLINIC | Age: 69
End: 2024-12-09
Payer: COMMERCIAL

## 2024-12-09 DIAGNOSIS — R97.20 ELEVATED PSA: Primary | ICD-10-CM

## 2024-12-09 PROCEDURE — G2211 COMPLEX E/M VISIT ADD ON: HCPCS | Performed by: STUDENT IN AN ORGANIZED HEALTH CARE EDUCATION/TRAINING PROGRAM

## 2024-12-09 PROCEDURE — 99214 OFFICE O/P EST MOD 30 MIN: CPT | Performed by: STUDENT IN AN ORGANIZED HEALTH CARE EDUCATION/TRAINING PROGRAM

## 2025-02-25 ENCOUNTER — TELEPHONE (OUTPATIENT)
Age: 70
End: 2025-02-25
Payer: COMMERCIAL

## 2025-02-25 NOTE — TELEPHONE ENCOUNTER
Pt called, states he is having increased pain in his wrist. Notes the prednisone he started about 1 week and a half ago he's bumped up from 10mg BID to 5 10mg daily the last couple days due to the pain. He states he is wearing a brace. Would you like to see him in office? Please advise.

## 2025-02-28 ENCOUNTER — OFFICE VISIT (OUTPATIENT)
Age: 70
End: 2025-02-28
Payer: COMMERCIAL

## 2025-02-28 VITALS
SYSTOLIC BLOOD PRESSURE: 138 MMHG | DIASTOLIC BLOOD PRESSURE: 80 MMHG | WEIGHT: 304 LBS | HEART RATE: 67 BPM | HEIGHT: 67 IN | BODY MASS INDEX: 47.71 KG/M2 | OXYGEN SATURATION: 97 %

## 2025-02-28 DIAGNOSIS — R73.02 IGT (IMPAIRED GLUCOSE TOLERANCE): ICD-10-CM

## 2025-02-28 DIAGNOSIS — R97.20 ELEVATED PSA: Primary | ICD-10-CM

## 2025-02-28 DIAGNOSIS — M1A.0320 CHRONIC GOUT OF LEFT WRIST, UNSPECIFIED CAUSE: ICD-10-CM

## 2025-02-28 DIAGNOSIS — I25.810 CORONARY ARTERY DISEASE INVOLVING AUTOLOGOUS ARTERY CORONARY BYPASS GRAFT WITHOUT ANGINA PECTORIS: ICD-10-CM

## 2025-02-28 DIAGNOSIS — I10 ESSENTIAL HYPERTENSION: ICD-10-CM

## 2025-02-28 PROCEDURE — 1160F RVW MEDS BY RX/DR IN RCRD: CPT | Performed by: FAMILY MEDICINE

## 2025-02-28 PROCEDURE — 3079F DIAST BP 80-89 MM HG: CPT | Performed by: FAMILY MEDICINE

## 2025-02-28 PROCEDURE — 3008F BODY MASS INDEX DOCD: CPT | Performed by: FAMILY MEDICINE

## 2025-02-28 PROCEDURE — 3075F SYST BP GE 130 - 139MM HG: CPT | Performed by: FAMILY MEDICINE

## 2025-02-28 PROCEDURE — 99214 OFFICE O/P EST MOD 30 MIN: CPT | Performed by: FAMILY MEDICINE

## 2025-02-28 PROCEDURE — 1036F TOBACCO NON-USER: CPT | Performed by: FAMILY MEDICINE

## 2025-02-28 PROCEDURE — 1159F MED LIST DOCD IN RCRD: CPT | Performed by: FAMILY MEDICINE

## 2025-02-28 RX ORDER — METFORMIN HYDROCHLORIDE 500 MG/1
500 TABLET ORAL DAILY
Qty: 90 TABLET | Refills: 3 | Status: SHIPPED | OUTPATIENT
Start: 2025-02-28

## 2025-02-28 RX ORDER — TAMSULOSIN HYDROCHLORIDE 0.4 MG/1
0.4 CAPSULE ORAL DAILY
Qty: 90 CAPSULE | Refills: 3 | Status: SHIPPED | OUTPATIENT
Start: 2025-02-28

## 2025-02-28 RX ORDER — AMLODIPINE BESYLATE 5 MG/1
5 TABLET ORAL DAILY
Qty: 90 TABLET | Refills: 3 | Status: SHIPPED | OUTPATIENT
Start: 2025-02-28 | End: 2026-02-28

## 2025-02-28 RX ORDER — RAMIPRIL 10 MG/1
10 CAPSULE ORAL
Qty: 90 CAPSULE | Refills: 3 | Status: SHIPPED | OUTPATIENT
Start: 2025-02-28 | End: 2026-02-28

## 2025-02-28 RX ORDER — POTASSIUM CHLORIDE 20 MEQ/1
20 TABLET, EXTENDED RELEASE ORAL DAILY
Qty: 90 TABLET | Refills: 3 | Status: SHIPPED | OUTPATIENT
Start: 2025-02-28

## 2025-02-28 ASSESSMENT — PATIENT HEALTH QUESTIONNAIRE - PHQ9
1. LITTLE INTEREST OR PLEASURE IN DOING THINGS: NOT AT ALL
2. FEELING DOWN, DEPRESSED OR HOPELESS: NOT AT ALL
SUM OF ALL RESPONSES TO PHQ9 QUESTIONS 1 AND 2: 0

## 2025-02-28 NOTE — PROGRESS NOTES
"Subjective   Patient ID: Joseph Isaac is a 70 y.o. male who presents for Wrist Pain (Left wrist pain x 2 weeks , NKI).    HPI   L wrist again flared up but took up to 50 mg of prednisone to quieted down.  Is now off.  Did not raise blood sugars.  A week and a half earlier reduce the snowblower but no known injury.  Reviewed Dr. Couch who feels its OA and his CMC joint with arthritic change.  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  Will monitor for frequency and severity of attacks off the diuretic will monitor blood pressure.  Prostate evaluated by urology had MRI.  Level is stable and will follow at 6-month intervals through urology  HTN-Takes and tolerates meds without side effects. No alcohol. no tobacco. no exercise. low salt.  Reviewed recommendation for 150 minutes of exercise per week including 2 days of weight training if over age 50  CAD stable without angina  DM/IGT  Review of Systems  General-no fatigue weight to within 10 pounds  ENT no problems with vision swallowing  Cardiac no chest pains palpitations change in exercise tolerance or capacity  Pulmonary no cough shortness of breath  GI no heartburn or abdominal pain  Musculoskeletal multiple joint pains  Objective   /80   Pulse 67   Ht 1.702 m (5' 7\")   Wt 138 kg (304 lb)   SpO2 97%   BMI 47.61 kg/m²     Physical Exam  Tender in first CMC joint.  Otherwise heart regular lungs clear  Assessment/Plan   Problem List Items Addressed This Visit             ICD-10-CM    Elevated PSA - Primary R97.20    Relevant Medications    tamsulosin (Flomax) 0.4 mg 24 hr capsule    Other Relevant Orders    PSA    Essential hypertension I10    Relevant Medications    potassium chloride CR 20 mEq ER tablet    IGT (impaired glucose tolerance) R73.02    Relevant Medications    metFORMIN (Glucophage) 500 mg tablet "    ramipril (Altace) 10 mg capsule    Coronary artery disease involving autologous artery coronary bypass graft without angina pectoris I25.810    Relevant Medications    amLODIPine (Norvasc) 5 mg tablet     Other Visit Diagnoses         Codes    Chronic gout of left wrist, unspecified cause     M1A.0320    Relevant Orders    Uric Acid        Stop chlorthalidone

## 2025-03-20 DIAGNOSIS — I25.810 CORONARY ARTERY DISEASE INVOLVING AUTOLOGOUS ARTERY CORONARY BYPASS GRAFT WITHOUT ANGINA PECTORIS: Primary | ICD-10-CM

## 2025-03-20 RX ORDER — FUROSEMIDE 40 MG/1
40 TABLET ORAL DAILY
Qty: 90 TABLET | Refills: 3 | Status: SHIPPED | OUTPATIENT
Start: 2025-03-20 | End: 2026-03-20

## 2025-04-11 LAB
ALBUMIN SERPL-MCNC: 4.3 G/DL (ref 3.6–5.1)
ALBUMIN/CREAT UR: 27 MG/G CREAT
ALP SERPL-CCNC: 69 U/L (ref 35–144)
ALT SERPL-CCNC: 14 U/L (ref 9–46)
ANION GAP SERPL CALCULATED.4IONS-SCNC: 11 MMOL/L (CALC) (ref 7–17)
AST SERPL-CCNC: 18 U/L (ref 10–35)
BILIRUB SERPL-MCNC: 0.6 MG/DL (ref 0.2–1.2)
BUN SERPL-MCNC: 17 MG/DL (ref 7–25)
CALCIUM SERPL-MCNC: 9.8 MG/DL (ref 8.6–10.3)
CHLORIDE SERPL-SCNC: 103 MMOL/L (ref 98–110)
CHOLEST SERPL-MCNC: 145 MG/DL
CHOLEST/HDLC SERPL: 3.6 (CALC)
CO2 SERPL-SCNC: 27 MMOL/L (ref 20–32)
CREAT SERPL-MCNC: 1 MG/DL (ref 0.7–1.35)
CREAT UR-MCNC: 130 MG/DL (ref 20–320)
EGFRCR SERPLBLD CKD-EPI 2021: 81 ML/MIN/1.73M2
ERYTHROCYTE [DISTWIDTH] IN BLOOD BY AUTOMATED COUNT: 17.2 % (ref 11–15)
EST. AVERAGE GLUCOSE BLD GHB EST-MCNC: 128 MG/DL
EST. AVERAGE GLUCOSE BLD GHB EST-SCNC: 7.1 MMOL/L
GLUCOSE SERPL-MCNC: 111 MG/DL (ref 65–99)
HBA1C MFR BLD: 6.1 % OF TOTAL HGB
HCT VFR BLD AUTO: 47.5 % (ref 38.5–50)
HDLC SERPL-MCNC: 40 MG/DL
HGB BLD-MCNC: 15.2 G/DL (ref 13.2–17.1)
LDLC SERPL CALC-MCNC: 80 MG/DL (CALC)
MCH RBC QN AUTO: 25.1 PG (ref 27–33)
MCHC RBC AUTO-ENTMCNC: 32 G/DL (ref 32–36)
MCV RBC AUTO: 78.5 FL (ref 80–100)
MICROALBUMIN UR-MCNC: 3.5 MG/DL
NONHDLC SERPL-MCNC: 105 MG/DL (CALC)
PLATELET # BLD AUTO: 191 THOUSAND/UL (ref 140–400)
PMV BLD REES-ECKER: 10.3 FL (ref 7.5–12.5)
POTASSIUM SERPL-SCNC: 3.9 MMOL/L (ref 3.5–5.3)
PROT SERPL-MCNC: 6.7 G/DL (ref 6.1–8.1)
PSA SERPL-MCNC: 5.91 NG/ML
RBC # BLD AUTO: 6.05 MILLION/UL (ref 4.2–5.8)
SODIUM SERPL-SCNC: 141 MMOL/L (ref 135–146)
TRIGL SERPL-MCNC: 157 MG/DL
URATE SERPL-MCNC: 8 MG/DL (ref 4–8)
WBC # BLD AUTO: 5.9 THOUSAND/UL (ref 3.8–10.8)

## 2025-04-18 ENCOUNTER — APPOINTMENT (OUTPATIENT)
Age: 70
End: 2025-04-18
Payer: COMMERCIAL

## 2025-04-18 VITALS
HEART RATE: 75 BPM | OXYGEN SATURATION: 97 % | DIASTOLIC BLOOD PRESSURE: 80 MMHG | SYSTOLIC BLOOD PRESSURE: 142 MMHG | WEIGHT: 307 LBS | BODY MASS INDEX: 48.08 KG/M2

## 2025-04-18 DIAGNOSIS — M32.10 DRUG-INDUCED SYSTEMIC LUPUS ERYTHEMATOSUS WITH OTHER ORGAN INVOLVEMENT: ICD-10-CM

## 2025-04-18 DIAGNOSIS — M1A.0390 IDIOPATHIC CHRONIC GOUT OF WRIST WITHOUT TOPHUS, UNSPECIFIED LATERALITY: Primary | ICD-10-CM

## 2025-04-18 DIAGNOSIS — K21.00 GASTROESOPHAGEAL REFLUX DISEASE WITH ESOPHAGITIS WITHOUT HEMORRHAGE: ICD-10-CM

## 2025-04-18 DIAGNOSIS — M19.90 INFLAMMATORY ARTHROPATHY: ICD-10-CM

## 2025-04-18 DIAGNOSIS — I25.810 CORONARY ARTERY DISEASE INVOLVING AUTOLOGOUS ARTERY CORONARY BYPASS GRAFT WITHOUT ANGINA PECTORIS: ICD-10-CM

## 2025-04-18 DIAGNOSIS — E11.9 TYPE 2 DIABETES MELLITUS WITHOUT COMPLICATION, WITHOUT LONG-TERM CURRENT USE OF INSULIN: ICD-10-CM

## 2025-04-18 DIAGNOSIS — I10 ESSENTIAL HYPERTENSION: ICD-10-CM

## 2025-04-18 DIAGNOSIS — C18.9 MALIGNANT NEOPLASM OF COLON, UNSPECIFIED PART OF COLON (MULTI): ICD-10-CM

## 2025-04-18 DIAGNOSIS — M32.0 DRUG-INDUCED SYSTEMIC LUPUS ERYTHEMATOSUS WITH OTHER ORGAN INVOLVEMENT: ICD-10-CM

## 2025-04-18 PROCEDURE — 1160F RVW MEDS BY RX/DR IN RCRD: CPT | Performed by: FAMILY MEDICINE

## 2025-04-18 PROCEDURE — 99214 OFFICE O/P EST MOD 30 MIN: CPT | Performed by: FAMILY MEDICINE

## 2025-04-18 PROCEDURE — 1036F TOBACCO NON-USER: CPT | Performed by: FAMILY MEDICINE

## 2025-04-18 PROCEDURE — 3077F SYST BP >= 140 MM HG: CPT | Performed by: FAMILY MEDICINE

## 2025-04-18 PROCEDURE — 4010F ACE/ARB THERAPY RXD/TAKEN: CPT | Performed by: FAMILY MEDICINE

## 2025-04-18 PROCEDURE — 1159F MED LIST DOCD IN RCRD: CPT | Performed by: FAMILY MEDICINE

## 2025-04-18 PROCEDURE — 3079F DIAST BP 80-89 MM HG: CPT | Performed by: FAMILY MEDICINE

## 2025-04-18 RX ORDER — PREDNISONE 10 MG/1
10 TABLET ORAL DAILY
Qty: 40 TABLET | Refills: 3 | Status: SHIPPED | OUTPATIENT
Start: 2025-04-18 | End: 2025-09-25

## 2025-04-18 RX ORDER — ALLOPURINOL 100 MG/1
100 TABLET ORAL DAILY
Qty: 90 TABLET | Refills: 3 | Status: SHIPPED | OUTPATIENT
Start: 2025-04-18

## 2025-04-18 NOTE — PROGRESS NOTES
Subjective   Patient ID: Joseph Isaac is a 70 y.o. male who presents for Follow-up (6 mo rev labs).    HPI   Since the last office visit there have been no interval operations, hospitalizations, important illnesses or injuries.  CAD no angina, [place] stopped statin.  Lasix voiding is inconvenient he does not have much in the way of swelling.  We can consider to  try half tab and mnonitor edema.  Has seen indy regarding monitoring of PSA at 5.91 stable from 6.0  Weight is down 20# frommax.  Weight loss velocity is slowed   Diabetes in excellent control at 6.1  Gout continues to have some wrist pain uric acid 8.  Will begin allopurinol 100 mg with CBC and uric acid monthly and titration.  Has prednisone for prophylaxis or abortive treatment.  Last colonoscopy January 2024  Review of Systems  Other than per HPI he denies any chest pains palpitations cough shortness of breath heartburn or abdominal pain.  There is no change in his exercise tolerance.  Objective   /80   Pulse 75   Wt 139 kg (307 lb)   SpO2 97%   BMI 48.08 kg/m²     Physical Exam  General:  Alert, No acute distress. Appears stated age  Eye:  Pupils are equal, round and reactive to light, Extraocular movements are intact, Normal conjunctiva.    Neck:  Supple, Non-tender, No carotid bruit, No jugular venous distention, No lymphadenopathy, No thyromegaly.    Respiratory:  Lungs are clear to auscultation, Respirations are non-labored, Breath sounds are equal.    Cardiovascular:  Normal rate, Regular rhythm, No murmur.    Gastrointestinal:  Soft, Non-tender, No organomegaly. No solid or pulsatile mass  Integumentary:  Warm, Dry. No concerning lesions on exposed areas  Neurologic:  Alert, Oriented.  Gross and fine motor intact, CN 2-12 intact  Psychiatric:  Cooperative, Appropriate mood & affect.  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    Relevant Medications    predniSONE (Deltasone) 10 mg tablet    Other Relevant Orders    Follow Up In Primary Care    Coronary artery disease involving autologous artery coronary bypass graft without angina pectoris I25.810    Relevant Orders    Follow Up In Primary Care     Other Visit Diagnoses         Codes      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

## 2025-05-18 DIAGNOSIS — M1A.0390 IDIOPATHIC CHRONIC GOUT OF WRIST WITHOUT TOPHUS, UNSPECIFIED LATERALITY: ICD-10-CM

## 2025-05-23 LAB
ERYTHROCYTE [DISTWIDTH] IN BLOOD BY AUTOMATED COUNT: 16.9 % (ref 11–15)
HCT VFR BLD AUTO: 49.3 % (ref 38.5–50)
HGB BLD-MCNC: 15.9 G/DL (ref 13.2–17.1)
MCH RBC QN AUTO: 26.3 PG (ref 27–33)
MCHC RBC AUTO-ENTMCNC: 32.3 G/DL (ref 32–36)
MCV RBC AUTO: 81.5 FL (ref 80–100)
PLATELET # BLD AUTO: 208 THOUSAND/UL (ref 140–400)
PMV BLD REES-ECKER: 9.9 FL (ref 7.5–12.5)
RBC # BLD AUTO: 6.05 MILLION/UL (ref 4.2–5.8)
URATE SERPL-MCNC: 6 MG/DL (ref 4–8)
WBC # BLD AUTO: 6.7 THOUSAND/UL (ref 3.8–10.8)

## 2025-07-12 LAB
ERYTHROCYTE [DISTWIDTH] IN BLOOD BY AUTOMATED COUNT: 15.5 % (ref 11–15)
HCT VFR BLD AUTO: 49.9 % (ref 38.5–50)
HGB BLD-MCNC: 15.8 G/DL (ref 13.2–17.1)
MCH RBC QN AUTO: 26.3 PG (ref 27–33)
MCHC RBC AUTO-ENTMCNC: 31.7 G/DL (ref 32–36)
MCV RBC AUTO: 83.2 FL (ref 80–100)
PLATELET # BLD AUTO: 211 THOUSAND/UL (ref 140–400)
PMV BLD REES-ECKER: 9.8 FL (ref 7.5–12.5)
RBC # BLD AUTO: 6 MILLION/UL (ref 4.2–5.8)
WBC # BLD AUTO: 8.8 THOUSAND/UL (ref 3.8–10.8)

## 2025-07-25 DIAGNOSIS — E66.01 MORBID OBESITY WITH BMI OF 45.0-49.9, ADULT (MULTI): ICD-10-CM

## 2025-07-25 DIAGNOSIS — R73.02 IGT (IMPAIRED GLUCOSE TOLERANCE): ICD-10-CM

## 2025-07-25 DIAGNOSIS — I25.810 CORONARY ARTERY DISEASE INVOLVING AUTOLOGOUS ARTERY CORONARY BYPASS GRAFT WITHOUT ANGINA PECTORIS: ICD-10-CM

## 2025-07-25 RX ORDER — TIRZEPATIDE 15 MG/.5ML
15 INJECTION, SOLUTION SUBCUTANEOUS WEEKLY
Qty: 2 ML | Refills: 11 | Status: SHIPPED | OUTPATIENT
Start: 2025-07-25

## 2025-07-25 RX ORDER — EZETIMIBE 10 MG/1
10 TABLET ORAL DAILY
Qty: 90 TABLET | Refills: 3 | Status: SHIPPED | OUTPATIENT
Start: 2025-07-25

## 2025-07-29 DIAGNOSIS — M19.90 INFLAMMATORY ARTHROPATHY: ICD-10-CM

## 2025-07-29 RX ORDER — PREDNISONE 10 MG/1
10 TABLET ORAL DAILY
Qty: 40 TABLET | Refills: 3 | Status: SHIPPED | OUTPATIENT
Start: 2025-07-29 | End: 2026-01-05

## 2025-10-30 ENCOUNTER — APPOINTMENT (OUTPATIENT)
Age: 70
End: 2025-10-30
Payer: COMMERCIAL